# Patient Record
Sex: FEMALE | Race: BLACK OR AFRICAN AMERICAN | Employment: FULL TIME | URBAN - METROPOLITAN AREA
[De-identification: names, ages, dates, MRNs, and addresses within clinical notes are randomized per-mention and may not be internally consistent; named-entity substitution may affect disease eponyms.]

---

## 2017-01-19 ENCOUNTER — OFFICE VISIT (OUTPATIENT)
Dept: FAMILY MEDICINE CLINIC | Age: 32
End: 2017-01-19

## 2017-01-19 VITALS
WEIGHT: 293 LBS | BODY MASS INDEX: 51.91 KG/M2 | HEART RATE: 91 BPM | RESPIRATION RATE: 20 BRPM | HEIGHT: 63 IN | TEMPERATURE: 99.2 F | DIASTOLIC BLOOD PRESSURE: 88 MMHG | OXYGEN SATURATION: 98 % | SYSTOLIC BLOOD PRESSURE: 124 MMHG

## 2017-01-19 DIAGNOSIS — Z11.4 SCREENING FOR HIV (HUMAN IMMUNODEFICIENCY VIRUS): ICD-10-CM

## 2017-01-19 DIAGNOSIS — E04.9 ENLARGED THYROID: Primary | ICD-10-CM

## 2017-01-19 DIAGNOSIS — R13.10 DYSPHAGIA, UNSPECIFIED TYPE: ICD-10-CM

## 2017-01-19 NOTE — PROGRESS NOTES
HISTORY OF PRESENT ILLNESS  Leticia Madrid is a 33 y.o. female. HPI  Patient comes in today for difficulty swallowing and dyspnea. When laying down, feels like throat is constricting and has hard time breathing. Feels like lumps going down throat when she drinks for 2-3 weeks. Doesn't happen daily. If she sits up or lays flat, goes away. Normally happens when laying on side. Feels like throat extremely dry. No throat pain. Feels like choking. Daughter had strep over summer but nothing recently. No fever, chills, cough, rhinorrhea, nasal congestion. Snores at night. Unsure if has apnea. Denies weight changes, chest pains, palpitations, hand tremors, heat or cold intolerance, constipation or diarrhea, dry skin, hair loss or brittle nails, fatigue. FH of thyroid disease (MGF - thyroid Ca)  No Known Allergies    Past Medical History   Diagnosis Date    Anemia     Mixed incontinence urge and stress        Past Surgical History   Procedure Laterality Date    Hx tubal ligation       2005       Social History     Social History    Marital status: SINGLE     Spouse name: N/A    Number of children: N/A    Years of education: N/A     Occupational History    Not on file. 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          Family History   Problem Relation Age of Onset    No Known Problems Mother     Hypertension Father     Diabetes Sister     No Known Problems Brother     Diabetes Sister     Cancer Maternal Grandfather      thyroid    Stroke Maternal Grandmother        No current outpatient prescriptions on file. No current facility-administered medications for this visit. Review of Systems   Constitutional: Negative for chills, diaphoresis, fever, malaise/fatigue and weight loss. HENT: Negative for congestion, ear pain, sore throat and tinnitus. Difficulty swallowing   Respiratory: Negative for cough, sputum production, shortness of breath and wheezing. Cardiovascular: Negative for chest pain, palpitations and leg swelling. Gastrointestinal: Negative for abdominal pain, blood in stool, constipation, diarrhea, heartburn, nausea and vomiting. Genitourinary: Negative for dysuria, flank pain, frequency, hematuria and urgency. Musculoskeletal: Negative for back pain. Skin: Negative. Neurological: Negative for dizziness and headaches. Endo/Heme/Allergies: Negative for environmental allergies. Vitals:    01/19/17 1545   BP: 124/88   Pulse: 91   Resp: 20   Temp: 99.2 °F (37.3 °C)   TempSrc: Oral   SpO2: 98%   Weight: 309 lb (140.2 kg)   Height: 5' 3\" (1.6 m)     Physical Exam   Constitutional: She is oriented to person, place, and time. Vital signs are normal. She appears well-developed and well-nourished. She is cooperative. HENT:   Right Ear: Hearing, tympanic membrane, external ear and ear canal normal.   Left Ear: Hearing, tympanic membrane, external ear and ear canal normal.   Nose: Nose normal.   Mouth/Throat: Uvula is midline, oropharynx is clear and moist and mucous membranes are normal.   Neck: Trachea normal, normal range of motion and full passive range of motion without pain. Neck supple. Thyromegaly present. No thyroid mass present. Cardiovascular: Normal rate, regular rhythm and normal heart sounds. Pulmonary/Chest: Effort normal and breath sounds normal.   Lymphadenopathy:        Head (right side): No submental, no submandibular and no tonsillar adenopathy present. Head (left side): No submental, no submandibular and no tonsillar adenopathy present. She has no cervical adenopathy. Neurological: She is alert and oriented to person, place, and time. Skin: Skin is warm and dry. Psychiatric: She has a normal mood and affect. Her behavior is normal. Judgment and thought content normal.     ASSESSMENT and PLAN    ICD-10-CM ICD-9-CM    1. Enlarged thyroid E04.9 240.9 US THYROID/PARATHYROID/SOFT TISS      THYROID PANEL W/TSH   2. Dysphagia, unspecified type R13.10 787.20 US THYROID/PARATHYROID/SOFT TISS      THYROID PANEL W/TSH     Encounter Diagnoses   Name Primary?  Enlarged thyroid Yes    Dysphagia, unspecified type      Orders Placed This Encounter    US THYROID/PARATHYROID/SOFT TISS    THYROID PANEL W/TSH     Leonela Degroot was seen today for breathing problem. Diagnoses and all orders for this visit:    Enlarged thyroid  -     US THYROID/PARATHYROID/SOFT TISS; Future - scheduled at [facility] at 430p (arrive by 4p)  -     THYROID PANEL W/TSH    Dysphagia, unspecified type  -     US THYROID/PARATHYROID/SOFT TISS; Future  -     THYROID PANEL W/TSH      Follow-up Disposition: Not on File  lab results and schedule of future lab studies reviewed with patient  radiology results and schedule of future radiology studies reviewed with patient    I have reviewed the patient's allergies and made any necessary changes. Medical, procedural, social and family histories have been reviewed and updated as medically indicated. I have reconciled and/or revised patient medications in the EMR. I have discussed each diagnosis listed in this note with Jarrett Frost and/or their family. I have discussed treatment options and the risk/benefit analysis of those options, including safe use of medications and possible medication side effects. Through the use of shared decision making we have agreed to the above plan.       The patient has received an after-visit summary and questions were answered concerning future plans. Maritza Matute, FNP-C    This note will not be viewable in [facility].

## 2017-01-19 NOTE — PATIENT INSTRUCTIONS
Goiter: Care Instructions  Your Care Instructions    A goiter is an enlarged thyroid gland. It can cause swelling in your neck. Your thyroid is found in the front of your neck. It makes a hormone that controls how your body uses energy. Goiters are caused by different things. Some are caused by high or low levels of thyroid hormone. Others are caused by too little iodine in the diet, or a growth or disease in the thyroid. In the [place], most goiters are caused by long-term autoimmune thyroiditis. This is also called Hashimoto's thyroiditis. It happens when the body's immune system damages the thyroid. You may take thyroid hormone to reduce the size of your goiter. Or you may need surgery or radioactive iodine treatment. Some people don't need any treatment. They only need to watch for changes in the goiter. Follow-up care is a key part of your treatment and safety. Be sure to make and go to all appointments, and call your doctor if you are having problems. It's also a good idea to know your test results and keep a list of the medicines you take. How can you care for yourself at home? · Be safe with medicines. Take your medicines exactly as prescribed. Call your doctor if you think you are having a problem with your medicine. You will get more details on the specific medicines your doctor prescribes. When should you call for help? Call 911 anytime you think you may need emergency care. For example, call if:  · You have trouble breathing. Watch closely for changes in your health, and be sure to contact your doctor if:  · Your eyes turn red and bulge. · You have trouble swallowing. · You feel very tired or weak. · You lose weight but are eating the same or more than usual.  Where can you learn more? Go to http://ezra-ezequiel.info/. Enter U151 in the search box to learn more about \"Goiter: Care Instructions. \"  Current as of: July 28, 2016  Content Version: 11.1  © 7898-0438 [employer]. Care instructions adapted under license by [employer] (which disclaims liability or warranty for this information). If you have questions about a medical condition or this instruction, always ask your healthcare professional. [address] any warranty or liability for your use of this information.

## 2017-01-19 NOTE — MR AVS SNAPSHOT
Visit Information Date & Time Provider Department Dept. Phone Encounter #  
 1/19/2017  3:30 PM Jean-Pierre Mistry [phone] [id number] Follow-up Instructions Return if symptoms worsen or fail to improve. Upcoming Health Maintenance Date Due  
 PAP AKA CERVICAL CYTOLOGY 4/6/2019 DTaP/Tdap/Td series (2 - Td) 3/18/2026 Allergies as of 1/19/2017  Review Complete On: 1/19/2017 By: Dunia Marinelli LPN No Known Allergies Current Immunizations  Reviewed on 3/21/2016 Name Date Tdap 3/18/2016  9:16 AM  
  
 Not reviewed this visit You Were Diagnosed With   
  
 Codes Comments Enlarged thyroid    -  Primary ICD-10-CM: E04.9 ICD-9-CM: 240.9 Dysphagia, unspecified type     ICD-10-CM: R13.10 ICD-9-CM: 787.20 Screening for HIV (human immunodeficiency virus)     ICD-10-CM: Z11.4 ICD-9-CM: V73.89 Vitals BP Pulse Temp Resp Height(growth percentile) Weight(growth percentile) 124/88 91 99.2 °F (37.3 °C) (Oral) 20 5' 3\" (1.6 m) 309 lb (140.2 kg) LMP SpO2 BMI OB Status Smoking Status 01/18/2017 98% 54.74 kg/m2 Having regular periods Former Smoker Vitals History BMI and BSA Data Body Mass Index Body Surface Area 54.74 kg/m 2 2.5 m 2 Preferred Pharmacy Pharmacy Name Phone [facility] [address] [place] [address] [place]  [place] [phone] Your Updated Medication List  
  
Notice  As of 1/19/2017  4:28 PM  
 You have not been prescribed any medications. We Performed the Following HIV 1/2 AG/AB, 4TH GENERATION,W RFLX CONFIRM [[id number] Custom] THYROID PANEL W/TSH [16679 CPT(R)] Follow-up Instructions Return if symptoms worsen or fail to improve. To-Do List   
 01/19/2017   Imaging:  US THYROID/PARATHYROID/SOFT TISS   
  
 01/20/2017 4:30 PM  
 Appointment with Claudio Anaya at [facility] Ultrasound ([phone]) GENERAL INSTRUCTIONS  1. Bring outside films ([employer]) pertaining to the affected area with you on the day of appointment. 2. A written order with a valid diagnosis and Physicians signature is required for all scheduled tests. 3. Check in at registration 30 minutes before your appointment time unless you were instructed to do otherwise. Patient Instructions Goiter: Care Instructions Your Care Instructions A goiter is an enlarged thyroid gland. It can cause swelling in your neck. Your thyroid is found in the front of your neck. It makes a hormone that controls how your body uses energy. Goiters are caused by different things. Some are caused by high or low levels of thyroid hormone. Others are caused by too little iodine in the diet, or a growth or disease in the thyroid. In the [place], most goiters are caused by long-term autoimmune thyroiditis. This is also called Hashimoto's thyroiditis. It happens when the body's immune system damages the thyroid. You may take thyroid hormone to reduce the size of your goiter. Or you may need surgery or radioactive iodine treatment. Some people don't need any treatment. They only need to watch for changes in the goiter. Follow-up care is a key part of your treatment and safety. Be sure to make and go to all appointments, and call your doctor if you are having problems. It's also a good idea to know your test results and keep a list of the medicines you take. How can you care for yourself at home? · Be safe with medicines. Take your medicines exactly as prescribed. Call your doctor if you think you are having a problem with your medicine. You will get more details on the specific medicines your doctor prescribes. When should you call for help? Call 911 anytime you think you may need emergency care. For example, call if: · You have trouble breathing. Watch closely for changes in your health, and be sure to contact your doctor if: 
· Your eyes turn red and bulge. · You have trouble swallowing. · You feel very tired or weak. · You lose weight but are eating the same or more than usual. 
Where can you learn more? Go to http://ezra-ezequiel.info/. Enter X770 in the search box to learn more about \"Goiter: Care Instructions. \" Current as of: July 28, 2016 Content Version: 11.1 © 3417-7911 [employer]. Care instructions adapted under license by [employer] (which disclaims liability or warranty for this information). If you have questions about a medical condition or this instruction, always ask your healthcare professional. [address] any warranty or liability for your use of this information. Introducing [address]! Noman Mayes introduces [employer] patient portal. Now you can access parts of your medical record, email your doctor's office, and request medication refills online. 1. In your internet browser, go to [email or web address] 2. Click on the First Time User? Click Here link in the Sign In box. You will see the New Member Sign Up page. 3. Enter your [employer] Access Code exactly as it appears below. You will not need to use this code after youve completed the sign-up process. If you do not sign up before the expiration date, you must request a new code. · [employer] Access Code: [email or web address] Expires: 4/19/2017  4:11 PM 
 
4. Enter the last four digits of your Social Security Number (xxxx) and Date of Birth (mm/dd/yyyy) as indicated and click Submit. You will be taken to the next sign-up page. 5. Create a Pl[employer] ID. This will be your [employer] login ID and cannot be changed, so think of one that is secure and easy to remember. 6. Create a [employer] password. You can change your password at any time. 7. Enter your Password Reset Question and Answer. This can be used at a later time if you forget your password. 8. Enter your e-mail address. You will receive e-mail notification when new information is available in [address] 9. Click Sign Up. You can now view and download portions of your medical record. 10. Click the Download Summary menu link to download a portable copy of your medical information. If you have questions, please visit the Frequently Asked Questions section of the [employer] website. Remember, [employer] is NOT to be used for urgent needs. For medical emergencies, dial 911. Now available from your iPhone and Android! Please provide this summary of care documentation to your next provider. Your primary care clinician is listed as [address] [facility]. If you have any questions after today's visit, please call [phone].

## 2017-01-20 ENCOUNTER — HOSPITAL ENCOUNTER (OUTPATIENT)
Dept: ULTRASOUND IMAGING | Age: 32
Discharge: HOME | End: 2017-01-20
Payer: COMMERCIAL

## 2017-01-20 DIAGNOSIS — R13.10 DYSPHAGIA, UNSPECIFIED TYPE: ICD-10-CM

## 2017-01-20 DIAGNOSIS — E04.9 ENLARGED THYROID: ICD-10-CM

## 2017-01-20 LAB
FT4I SERPL CALC-MCNC: 2.1 (ref 1.2–4.9)
HIV 1+2 AB+HIV1 P24 AG SERPL QL IA: NON REACTIVE
T3RU NFR SERPL: 30 % (ref 24–39)
T4 SERPL-MCNC: 6.9 UG/DL (ref 4.5–12)
TSH SERPL DL<=0.005 MIU/L-ACNC: 1.54 UIU/ML (ref 0.45–4.5)

## 2017-01-20 PROCEDURE — 76536 US EXAM OF HEAD AND NECK: CPT

## 2018-04-11 ENCOUNTER — OFFICE VISIT (OUTPATIENT)
Dept: FAMILY MEDICINE CLINIC | Age: 33
End: 2018-04-11

## 2018-04-11 VITALS
HEART RATE: 81 BPM | HEIGHT: 63 IN | DIASTOLIC BLOOD PRESSURE: 95 MMHG | TEMPERATURE: 98 F | WEIGHT: 293 LBS | OXYGEN SATURATION: 100 % | BODY MASS INDEX: 51.91 KG/M2 | RESPIRATION RATE: 18 BRPM | SYSTOLIC BLOOD PRESSURE: 157 MMHG

## 2018-04-11 DIAGNOSIS — Z11.3 SCREEN FOR STD (SEXUALLY TRANSMITTED DISEASE): ICD-10-CM

## 2018-04-11 DIAGNOSIS — G44.209 ACUTE NON INTRACTABLE TENSION-TYPE HEADACHE: ICD-10-CM

## 2018-04-11 DIAGNOSIS — G47.00 INSOMNIA, UNSPECIFIED TYPE: ICD-10-CM

## 2018-04-11 DIAGNOSIS — Z12.4 CERVICAL CANCER SCREENING: ICD-10-CM

## 2018-04-11 DIAGNOSIS — M54.2 NECK PAIN: ICD-10-CM

## 2018-04-11 DIAGNOSIS — Z00.00 ROUTINE GENERAL MEDICAL EXAMINATION AT A HEALTH CARE FACILITY: Primary | ICD-10-CM

## 2018-04-11 DIAGNOSIS — Z11.4 SCREENING FOR HIV (HUMAN IMMUNODEFICIENCY VIRUS): ICD-10-CM

## 2018-04-11 DIAGNOSIS — N63.0 BREAST LUMP: ICD-10-CM

## 2018-04-11 DIAGNOSIS — Z01.419 ENCOUNTER FOR GYNECOLOGICAL EXAMINATION: ICD-10-CM

## 2018-04-11 DIAGNOSIS — E66.01 MORBID OBESITY WITH BMI OF 50.0-59.9, ADULT (HCC): ICD-10-CM

## 2018-04-11 DIAGNOSIS — M54.9 UPPER BACK PAIN: ICD-10-CM

## 2018-04-11 RX ORDER — AMITRIPTYLINE HYDROCHLORIDE 10 MG/1
10 TABLET, FILM COATED ORAL
Qty: 30 TAB | Refills: 1 | Status: SHIPPED | OUTPATIENT
Start: 2018-04-11 | End: 2021-08-17

## 2018-04-11 NOTE — PROGRESS NOTES
HISTORY OF PRESENT ILLNESS  Radha Malcolm is a 27 y.o. female. HPI patient comes in today for CPE (pap)  Feels like throat is dry, difficulty swallowing, feels like throat is swelling up. Neck has been stiff for about 1 month. When she lays down, she has a pain in back of neck. She works at a computer all day. States she is having upper back pain. Not sure if related to posture, breasts, or work. States daily headaches. Sometimes she will wake up with a headache. Patient unsure if she snores. Takes a BC powder and headaches go away. States she does not sleep throughout night. Will last for 3-4 days. Normally goes to bed 9p. No recent eye exam.  Does not squint. Single mother so she is always stressed,. Was taking melatonin for sleep, caused drowsy effect. No vaginal complaints. No discharge, no vaginal lesions. Wants STD screening. No Known Allergies    Past Medical History:   Diagnosis Date    Anemia     Mixed incontinence urge and stress        Past Surgical History:   Procedure Laterality Date    HX TUBAL LIGATION      2005       Social History     Social History    Marital status: SINGLE     Spouse name: N/A    Number of children: N/A    Years of education: N/A     Occupational History    Not on file. 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          Family History   Problem Relation Age of Onset    No Known Problems Mother     Hypertension Father     Diabetes Sister     No Known Problems Brother     Diabetes Sister     Cancer Maternal Grandfather      thyroid    Stroke Maternal Grandmother        No current outpatient prescriptions on file. No current facility-administered medications for this visit. Review of Systems   Constitutional: Negative for chills, diaphoresis, fever, malaise/fatigue and weight loss. HENT: Negative for congestion, ear pain, sore throat and tinnitus. Eyes: Negative for blurred vision and double vision. Respiratory: Negative for cough, sputum production, shortness of breath and wheezing. Cardiovascular: Negative for chest pain, palpitations and leg swelling. Gastrointestinal: Negative for abdominal pain, blood in stool, constipation, diarrhea, nausea and vomiting. Genitourinary: Negative for dysuria, flank pain, frequency, hematuria and urgency. Musculoskeletal: Positive for back pain and neck pain. Negative for joint pain and myalgias. Skin: Negative. Neurological: Positive for headaches. Negative for dizziness, tingling, sensory change, speech change and focal weakness. Psychiatric/Behavioral: Negative for depression. The patient has insomnia. The patient is not nervous/anxious. Vitals:    04/11/18 1550 04/11/18 1627   BP: (!) 157/95 (!) (P) 138/92   Pulse: 81    Resp: 18    Temp: 98 °F (36.7 °C)    TempSrc: Oral    SpO2: 100%    Weight: 303 lb 12.8 oz (137.8 kg)    Height: 5' 3\" (1.6 m)      Physical Exam   Constitutional: She is oriented to person, place, and time. Vital signs are normal. She appears well-developed and well-nourished. She is cooperative. HENT:   Right Ear: Hearing, tympanic membrane, external ear and ear canal normal.   Left Ear: Hearing, tympanic membrane, external ear and ear canal normal.   Nose: Nose normal. Right sinus exhibits no maxillary sinus tenderness and no frontal sinus tenderness. Left sinus exhibits no maxillary sinus tenderness and no frontal sinus tenderness.    Mouth/Throat: Uvula is midline, oropharynx is clear and moist and mucous membranes are normal. Mucous membranes are not pale and not dry. No oropharyngeal exudate, posterior oropharyngeal edema or posterior oropharyngeal erythema. Neck: No thyroid mass and no thyromegaly present. Cardiovascular: Normal rate, regular rhythm, S1 normal, S2 normal and normal heart sounds. No murmur heard. Pulses:       Radial pulses are 2+ on the right side, and 2+ on the left side. Dorsalis pedis pulses are 2+ on the right side, and 2+ on the left side. Posterior tibial pulses are 2+ on the right side, and 2+ on the left side. Pulmonary/Chest: Effort normal and breath sounds normal. She has no decreased breath sounds. She has no wheezes. She has no rhonchi. She has no rales. Right breast exhibits mass (small beadline masses noted ). Right breast exhibits no inverted nipple, no nipple discharge, no skin change and no tenderness. Left breast exhibits mass (small beadlike masses noted). Left breast exhibits no inverted nipple, no nipple discharge, no skin change and no tenderness. Breasts are symmetrical.   Abdominal: Soft. Normal appearance and bowel sounds are normal. There is no hepatosplenomegaly. There is no tenderness. There is no CVA tenderness. abd obese, exam limited by body habitus   Genitourinary: Uterus normal. There is no rash, tenderness or lesion on the right labia. There is no rash, tenderness or lesion on the left labia. Uterus is not enlarged and not tender. Cervix exhibits no motion tenderness. Right adnexum displays no mass, no tenderness and no fullness. Left adnexum displays no mass, no tenderness and no fullness. No erythema or tenderness in the vagina. No vaginal discharge found. Genitourinary Comments: Bimanual exam limited by body habitus   Lymphadenopathy:        Head (right side): No submental, no submandibular, no tonsillar, no preauricular and no posterior auricular adenopathy present.         Head (left side): No submental, no submandibular, no tonsillar, no preauricular and no posterior auricular adenopathy present. She has no cervical adenopathy. Right: No supraclavicular adenopathy present. Left: No supraclavicular adenopathy present. Neurological: She is alert and oriented to person, place, and time. Skin: Skin is warm, dry and intact. Psychiatric: She has a normal mood and affect. Her speech is normal and behavior is normal. Thought content normal.   Vitals reviewed. ASSESSMENT and PLAN    ICD-10-CM ICD-9-CM    1. Routine general medical examination at a health care facility Z00.00 V70.0 AMB POC URINALYSIS DIP STICK AUTO W/O MICRO      CBC WITH AUTOMATED DIFF      METABOLIC PANEL, COMPREHENSIVE      AMB POC HEMOGLOBIN A1C      LIPID PANEL      VITAMIN D, 25 HYDROXY   2. Acute non intractable tension-type headache G44.209 339.10    3. Neck pain M54.2 723.1    4. Upper back pain M54.9 724.5    5. Insomnia, unspecified type G47.00 780.52    6. Cervical cancer screening Z12.4 V76.2 PAP (IMAGE GUIDED) W/REFL HPV ALL PATHOLOGY ([id number])   7. Breast lump N63.0 611.72 RIAZ MAMMO BI DX INCL CAD      US BREASTS COMPLETE   8. Screening for HIV (human immunodeficiency virus) Z11.4 V73.89 HIV 1/2 AG/AB, 4TH GENERATION,W RFLX CONFIRM   9. Encounter for gynecological examination Z01.419 V72.31    10. Screen for STD (sexually transmitted disease) Z11.3 V74.5 NUSWAB VAGINITIS PLUS     Encounter Diagnoses   Name Primary?     Routine general medical examination at a health care facility Yes    Acute non intractable tension-type headache     Neck pain     Upper back pain     Insomnia, unspecified type     Cervical cancer screening     Breast lump     Screening for HIV (human immunodeficiency virus)     Encounter for gynecological examination     Screen for STD (sexually transmitted disease)     Morbid obesity with BMI of 50.0-59.9, adult ([address]      Orders Placed This Encounter    RIAZ MAMMO BI DX INCL CAD    US BREASTS COMPLETE    CBC WITH AUTOMATED DIFF    METABOLIC PANEL, COMPREHENSIVE    LIPID PANEL    VITAMIN D, 25 HYDROXY    NUSWAB VAGINITIS PLUS    HIV 1/2 AG/AB, 4TH GENERATION,W RFLX CONFIRM    AMB POC URINALYSIS DIP STICK AUTO W/O MICRO    AMB POC HEMOGLOBIN A1C    amitriptyline (ELAVIL) 10 mg tablet    PAP (IMAGE GUIDED) W/REFL HPV ALL PATHOLOGY ([id number])     Diagnoses and all orders for this visit:    1. Routine general medical examination at a health care facility  -     AMB POC URINALYSIS DIP STICK AUTO W/O MICRO  -     CBC WITH AUTOMATED DIFF  -     METABOLIC PANEL, COMPREHENSIVE  -     AMB POC HEMOGLOBIN A1C  -     LIPID PANEL  -     VITAMIN D, 25 HYDROXY    2. Acute non intractable tension-type headache - patient to make appointment for eye exam.  Work on stress reduction. Patient to discuss with kids if she snores, may need CARINA evaluation. -     amitriptyline (ELAVIL) 10 mg tablet; Take 1 Tab by mouth nightly as needed for Sleep. 3. Neck pain - OTC NSAIDs, heat, HEP    4. Upper back pain- OTC NSAIDs, heat, HEP. Letter written for lumbar support in chair    5. Insomnia, unspecified type  -     amitriptyline (ELAVIL) 10 mg tablet; Take 1 Tab by mouth nightly as needed for Sleep. 6. Cervical cancer screening  -     PAP (IMAGE GUIDED) W/REFL HPV ALL PATHOLOGY ([id number])    7. Breast lump  -     RIAZ MAMMO BI DX INCL CAD; Future  -     US BREASTS COMPLETE; Future    8. Screening for HIV (human immunodeficiency virus)  -     HIV 1/2 AG/AB, 4TH GENERATION,W RFLX CONFIRM    9. Encounter for gynecological examination    10. Screen for STD (sexually transmitted disease)  -     NUSWAB VAGINITIS PLUS    11. Morbid obesity with BMI of 50.0-59.9, adult ([facility] [address]  -     I have reviewed/discussed the above normal BMI with the patient. I have recommended the following interventions: dietary management education, guidance, and counseling and encourage exercise .     -     Patient to schedule appointment for EKG as she is interested in phentermine for weight loss. Follow-up Disposition:  Return in about 1 year (around 4/11/2019). lab results and schedule of future lab studies reviewed with patient  reviewed diet, exercise and weight control    I have reviewed the patient's allergies and made any necessary changes. Medical, procedural, social and family histories have been reviewed and updated as medically indicated. I have reconciled and/or revised patient medications in the EMR. I have discussed each diagnosis listed in this note with Coleen Erickson and/or their family. I have discussed treatment options and the risk/benefit analysis of those options, including safe use of medications and possible medication side effects. Through the use of shared decision making we have agreed to the above plan. The patient has received an after-visit summary and questions were answered concerning future plans. Maritza Matute, [id number]    This note will not be viewable in [employer].

## 2018-04-11 NOTE — MR AVS SNAPSHOT
[address] [facility], [place] 
 
 
 [facility] [id number] [phone] Patient: Joann Sneed MRN: [id number] FPA:[id number] Visit Information Date & Time Provider Department Dept. Phone Encounter #  
 4/11/2018  3:30 PM Jean-Pierre Andersen [phone] [id number] Follow-up Instructions Return in about 1 year (around 4/11/2019). Upcoming Health Maintenance Date Due Influenza Age 5 to Adult 8/1/2017 PAP AKA CERVICAL CYTOLOGY 4/6/2019 DTaP/Tdap/Td series (2 - Td) 3/18/2026 Allergies as of 4/11/2018  Review Complete On: 4/11/2018 By: Carlo Valle LPN No Known Allergies Current Immunizations  Reviewed on 3/21/2016 Name Date Tdap 3/18/2016  9:16 AM  
  
 Not reviewed this visit You Were Diagnosed With   
  
 Codes Comments Routine general medical examination at a health care facility    -  Primary ICD-10-CM: Z00.00 ICD-9-CM: V70.0 Acute non intractable tension-type headache     ICD-10-CM: Z94.934 ICD-9-CM: 339.10 Neck pain     ICD-10-CM: M54.2 ICD-9-CM: 723.1 Upper back pain     ICD-10-CM: M54.9 ICD-9-CM: 724.5 Insomnia, unspecified type     ICD-10-CM: G47.00 ICD-9-CM: 780.52 Cervical cancer screening     ICD-10-CM: Z12.4 ICD-9-CM: V76.2 Breast lump     ICD-10-CM: N63.0 ICD-9-CM: 611.72 Screening for HIV (human immunodeficiency virus)     ICD-10-CM: Z11.4 ICD-9-CM: V73.89 Encounter for gynecological examination     ICD-10-CM: H41.344 ICD-9-CM: V72.31 Screen for STD (sexually transmitted disease)     ICD-10-CM: Z11.3 ICD-9-CM: V74.5 Vitals BP Pulse Temp Resp Height(growth percentile) Weight(growth percentile)  
 (!) (P) 138/92 81 98 °F (36.7 °C) (Oral) 18 5' 3\" (1.6 m) 303 lb 12.8 oz (137.8 kg) LMP SpO2 BMI OB Status Smoking Status 04/01/2018 100% 53.82 kg/m2 Having regular periods Former Smoker Vitals History BMI and BSA Data Body Mass Index Body Surface Area  
 53.82 kg/m 2 2.47 m 2 Preferred Pharmacy Pharmacy Name Phone [facility]  [place] [phone] Your Updated Medication List  
  
   
This list is accurate as of 4/11/18  5:00 PM.  Always use your most recent med list.  
  
  
  
  
 amitriptyline 10 mg tablet Commonly known as:  ELAVIL Take 1 Tab by mouth nightly as needed for Sleep. Prescriptions Sent to Pharmacy Refills  
 amitriptyline (ELAVIL) 10 mg tablet 1 Sig: Take 1 Tab by mouth nightly as needed for Sleep. Class: Normal  
 [facility] [address] #: [phone] Route: Oral  
  
We Performed the Following AMB POC HEMOGLOBIN A1C [29697 CPT(R)] AMB POC URINALYSIS DIP STICK AUTO W/O MICRO [36906 CPT(R)] CBC WITH AUTOMATED DIFF [92154 CPT(R)] HIV 1/2 AG/AB, 4TH GENERATION,W RFLX CONFIRM [id number] CPT(R)] LIPID PANEL [12668 CPT(R)] METABOLIC PANEL, COMPREHENSIVE [74364 CPT(R)] [address] [id number] Custom] PAP (IMAGE GUIDED) W/REFL HPV ALL PATHOLOGY (165128) [[id number] Custom] VITAMIN D, 25 HYDROXY [id number] CPT(R)] Follow-up Instructions Return in about 1 year (around 4/11/2019). To-Do List   
 04/11/2018 Imaging:  RIAZ MAMMO BI DX INCL CAD   
  
 04/11/2018 Imaging:  US BREASTS COMPLETE Patient Instructions Back Stretches: Exercises Your Care Instructions Here are some examples of exercises for stretching your back. Start each exercise slowly. Ease off the exercise if you start to have pain. Your doctor or physical therapist will tell you when you can start these exercises and which ones will work best for you. How to do the exercises Overhead stretch 1. Stand comfortably with your feet shoulder-width apart. 2. Looking straight ahead, raise both arms over your head and reach toward the ceiling. Do not allow your head to tilt back. 3. Hold for 15 to 30 seconds, then lower your arms to your sides. 4. Repeat 2 to 4 times. Side stretch 1. Stand comfortably with your feet shoulder-width apart. 2. Raise one arm over your head, and then lean to the other side. 3. Slide your hand down your leg as you let the weight of your arm gently stretch your side muscles. Hold for 15 to 30 seconds. 4. Repeat 2 to 4 times on each side. Press-up 1. Lie on your stomach, supporting your body with your forearms. 2. Press your elbows down into the floor to raise your upper back. As you do this, relax your stomach muscles and allow your back to arch without using your back muscles. As your press up, do not let your hips or pelvis come off the floor. 3. Hold for 15 to 30 seconds, then relax. 4. Repeat 2 to 4 times. Relax and rest 
 
1. Lie on your back with a rolled towel under your neck and a pillow under your knees. Extend your arms comfortably to your sides. 2. Relax and breathe normally. 3. Remain in this position for about 10 minutes. 4. If you can, do this 2 or 3 times each day. Follow-up care is a key part of your treatment and safety. Be sure to make and go to all appointments, and call your doctor if you are having problems. It's also a good idea to know your test results and keep a list of the medicines you take. Where can you learn more? Go to http://ezra-ezequiel.info/. Enter Z489 in the search box to learn more about \"Back Stretches: Exercises. \" Current as of: March 21, 2017 Content Version: 11.4 © 8271-1123 Healthwise, Incorporated. Care instructions adapted under license by [employer] (which disclaims liability or warranty for this information).  If you have questions about a medical condition or this instruction, always ask your healthcare professional. [place] [address] any warranty or liability for your use of this information. Neck: Exercises Your Care Instructions Here are some examples of typical rehabilitation exercises for your condition. Start each exercise slowly. Ease off the exercise if you start to have pain. Your doctor or physical therapist will tell you when you can start these exercises and which ones will work best for you. How to do the exercises Neck stretch 5. This stretch works best if you keep your shoulder down as you lean away from it. To help you remember to do this, start by relaxing your shoulders and lightly holding on to your thighs or your chair. 6. Tilt your head toward your shoulder and hold for 15 to 30 seconds. Let the weight of your head stretch your muscles. 7. If you would like a little added stretch, use your hand to gently and steadily pull your head toward your shoulder. For example, keeping your right shoulder down, lean your head to the left. 8. Repeat 2 to 4 times toward each shoulder. Diagonal neck stretch 5. Turn your head slightly toward the direction you will be stretching, and tilt your head diagonally toward your chest and hold for 15 to 30 seconds. 6. If you would like a little added stretch, use your hand to gently and steadily pull your head forward on the diagonal. 
7. Repeat 2 to 4 times toward each side. Dorsal glide stretch The dorsal glide stretches the back of the neck. If you feel pain, do not glide so far back. Some people find this exercise easier to do while lying on their backs with an ice pack on the neck. 5. Sit or stand tall and look straight ahead. 6. Slowly tuck your chin as you glide your head backward over your body 7. Hold for a count of 6, and then relax for up to 10 seconds. 8. Repeat 8 to 12 times. Chest and shoulder stretch 5. Sit or stand tall and glide your head backward as in the dorsal glide stretch. 6. Raise both arms so that your hands are next to your ears. 7. Take a deep breath, and as you breathe out, lower your elbows down and behind your back. You will feel your shoulder blades slide down and together, and at the same time you will feel a stretch across your chest and the front of your shoulders. 8. Hold for about 6 seconds, and then relax for up to 10 seconds. 9. Repeat 8 to 12 times. Strengthening: Hands on head 1. Move your head backward, forward, and side to side against gentle pressure from your hands, holding each position for about 6 seconds. 2. Repeat 8 to 12 times. Follow-up care is a key part of your treatment and safety. Be sure to make and go to all appointments, and call your doctor if you are having problems. It's also a good idea to know your test results and keep a list of the medicines you take. Where can you learn more? Go to http://ezra-ezequiel.info/. Enter P975 in the search box to learn more about \"Neck: Exercises. \" Current as of: March 21, 2017 Content Version: 11.4 © 5761-9104 [employer]. Care instructions adapted under license by [facility] (which disclaims liability or warranty for this information). If you have questions about a medical condition or this instruction, always ask your healthcare professional. [place] [address] any warranty or liability for your use of this information. Headache: Care Instructions Your Care Instructions Headaches have many possible causes. Most headaches aren't a sign of a more serious problem, and they will get better on their own. Home treatment may help you feel better faster. The doctor has checked you carefully, but problems can develop later. If you notice any problems or new symptoms, get medical treatment right away. Follow-up care is a key part of your treatment and safety. Be sure to make and go to all appointments, and call your doctor if you are having problems. It's also a good idea to know your test results and keep a list of the medicines you take. How can you care for yourself at home? · Do not drive if you have taken a prescription pain medicine. · Rest in a quiet, dark room until your headache is gone. Close your eyes and try to relax or go to sleep. Don't watch TV or read. · Put a cold, moist cloth or cold pack on the painful area for 10 to 20 minutes at a time. Put a thin cloth between the cold pack and your skin. · Use a warm, moist towel or a heating pad set on low to relax tight shoulder and neck muscles. · Have someone gently massage your neck and shoulders. · Take pain medicines exactly as directed. ¨ If the doctor gave you a prescription medicine for pain, take it as prescribed. ¨ If you are not taking a prescription pain medicine, ask your doctor if you can take an over-the-counter medicine. · Be careful not to take pain medicine more often than the instructions allow, because you may get worse or more frequent headaches when the medicine wears off. · Do not ignore new symptoms that occur with a headache, such as a fever, weakness or numbness, vision changes, or confusion. These may be signs of a more serious problem. To prevent headaches · Keep a headache diary so you can figure out what triggers your headaches. Avoiding triggers may help you prevent headaches. Record when each headache began, how long it lasted, and what the pain was like (throbbing, aching, stabbing, or dull). Write down any other symptoms you had with the headache, such as nausea, flashing lights or dark spots, or sensitivity to bright light or loud noise. Note if the headache occurred near your period.  List anything that might have triggered the headache, such as certain foods (chocolate, cheese, wine) or odors, smoke, bright light, stress, or lack of sleep. · Find healthy ways to deal with stress. Headaches are most common during or right after stressful times. Take time to relax before and after you do something that has caused a headache in the past. 
· Try to keep your muscles relaxed by keeping good posture. Check your jaw, face, neck, and shoulder muscles for tension, and try relaxing them. When sitting at a desk, change positions often, and stretch for 30 seconds each hour. · Get plenty of sleep and exercise. · Eat regularly and well. Long periods without food can trigger a headache. · Treat yourself to a massage. Some people find that regular massages are very helpful in relieving tension. · Limit caffeine by not drinking too much coffee, tea, or soda. But don't quit caffeine suddenly, because that can also give you headaches. · Reduce eyestrain from computers by blinking frequently and looking away from the computer screen every so often. Make sure you have proper eyewear and that your monitor is set up properly, about an arm's length away. · Seek help if you have depression or anxiety. Your headaches may be linked to these conditions. Treatment can both prevent headaches and help with symptoms of anxiety or depression. When should you call for help? Call 911 anytime you think you may need emergency care. For example, call if: 
? · You have signs of a stroke. These may include: 
¨ Sudden numbness, paralysis, or weakness in your face, arm, or leg, especially on only one side of your body. ¨ Sudden vision changes. ¨ Sudden trouble speaking. ¨ Sudden confusion or trouble understanding simple statements. ¨ Sudden problems with walking or balance. ¨ A sudden, severe headache that is different from past headaches. ?Call your doctor now or seek immediate medical care if: 
? · You have a new or worse headache. ? · Your headache gets much worse. Where can you learn more? Go to http://ezra-ezequiel.info/. Enter M271 in the search box to learn more about \"Headache: Care Instructions. \" Current as of: October 14, 2016 Content Version: 11.4 © 0074-7998 Healthwise, Incorporated. Care instructions adapted under license by [employer] (which disclaims liability or warranty for this information). If you have questions about a medical condition or this instruction, always ask your healthcare professional. [place] [address] any warranty or liability for your use of this information. Introducing [facility]! [employer] introduces [employer] patient portal. Now you can access parts of your medical record, email your doctor's office, and request medication refills online. 1. In your internet browser, go to [email or web address] 2. Click on the First Time User? Click Here link in the Sign In box. You will see the New Member Sign Up page. 3. Enter your [employer]Bad Access Code exactly as it appears below. You will not need to use this code after youve completed the sign-up process. If you do not sign up before the expiration date, you must request a new code. · [employer] Access Code: [email or web address] Expires: 7/10/2018  3:40 PM 
 
4. Enter the last four digits of your Social Security Number (xxxx) and Date of Birth (mm/dd/yyyy) as indicated and click Submit. You will be taken to the next sign-up page. 5. Create a BigBad ID. This will be your [employer]Bad login ID and cannot be changed, so think of one that is secure and easy to remember. 6. Create a BigBad password. You can change your password at any time. 7. Enter your Password Reset Question and Answer. This can be used at a later time if you forget your password. 8. Enter your e-mail address. You will receive e-mail notification when new information is available in [address] 9. Click Sign Up. You can now view and download portions of your medical record. 10. Click the Download Summary menu link to download a portable copy of your medical information. If you have questions, please visit the Frequently Asked Questions section of the [employer] website. Remember, [employer] is NOT to be used for urgent needs. For medical emergencies, dial 911. Now available from your iPhone and Android! Please provide this summary of care documentation to your next provider. Your primary care clinician is listed as [facility]. If you have any questions after today's visit, please call [phone].

## 2018-04-11 NOTE — PROGRESS NOTES
Chief Complaint   Patient presents with    Complete Physical     with pap     Pt declined flu vaccination today.

## 2018-04-11 NOTE — PATIENT INSTRUCTIONS
Back Stretches: Exercises  Your Care Instructions  Here are some examples of exercises for stretching your back. Start each exercise slowly. Ease off the exercise if you start to have pain. Your doctor or physical therapist will tell you when you can start these exercises and which ones will work best for you. How to do the exercises  Overhead stretch    1. Stand comfortably with your feet shoulder-width apart. 2. Looking straight ahead, raise both arms over your head and reach toward the ceiling. Do not allow your head to tilt back. 3. Hold for 15 to 30 seconds, then lower your arms to your sides. 4. Repeat 2 to 4 times. Side stretch    1. Stand comfortably with your feet shoulder-width apart. 2. Raise one arm over your head, and then lean to the other side. 3. Slide your hand down your leg as you let the weight of your arm gently stretch your side muscles. Hold for 15 to 30 seconds. 4. Repeat 2 to 4 times on each side. Press-up    1. Lie on your stomach, supporting your body with your forearms. 2. Press your elbows down into the floor to raise your upper back. As you do this, relax your stomach muscles and allow your back to arch without using your back muscles. As your press up, do not let your hips or pelvis come off the floor. 3. Hold for 15 to 30 seconds, then relax. 4. Repeat 2 to 4 times. Relax and rest    1. Lie on your back with a rolled towel under your neck and a pillow under your knees. Extend your arms comfortably to your sides. 2. Relax and breathe normally. 3. Remain in this position for about 10 minutes. 4. If you can, do this 2 or 3 times each day. Follow-up care is a key part of your treatment and safety. Be sure to make and go to all appointments, and call your doctor if you are having problems. It's also a good idea to know your test results and keep a list of the medicines you take. Where can you learn more? Go to http://ezra-ezequiel.info/.   Enter V492 in the search box to learn more about \"Back Stretches: Exercises. \"  Current as of: March 21, 2017  Content Version: 11.4  © 5594-8778 [employer]. Care instructions adapted under license by [employer] (which disclaims liability or warranty for this information). If you have questions about a medical condition or this instruction, always ask your healthcare professional. [address] any warranty or liability for your use of this information. Neck: Exercises  Your Care Instructions  Here are some examples of typical rehabilitation exercises for your condition. Start each exercise slowly. Ease off the exercise if you start to have pain. Your doctor or physical therapist will tell you when you can start these exercises and which ones will work best for you. How to do the exercises  Neck stretch    5. This stretch works best if you keep your shoulder down as you lean away from it. To help you remember to do this, start by relaxing your shoulders and lightly holding on to your thighs or your chair. 6. Tilt your head toward your shoulder and hold for 15 to 30 seconds. Let the weight of your head stretch your muscles. 7. If you would like a little added stretch, use your hand to gently and steadily pull your head toward your shoulder. For example, keeping your right shoulder down, lean your head to the left. 8. Repeat 2 to 4 times toward each shoulder. Diagonal neck stretch    5. Turn your head slightly toward the direction you will be stretching, and tilt your head diagonally toward your chest and hold for 15 to 30 seconds. 6. If you would like a little added stretch, use your hand to gently and steadily pull your head forward on the diagonal.  7. Repeat 2 to 4 times toward each side. Dorsal glide stretch    The dorsal glide stretches the back of the neck. If you feel pain, do not glide so far back.  Some people find this exercise easier to do while lying on their backs with an ice pack on the neck. 5. Sit or stand tall and look straight ahead. 6. Slowly tuck your chin as you glide your head backward over your body  7. Hold for a count of 6, and then relax for up to 10 seconds. 8. Repeat 8 to 12 times. Chest and shoulder stretch    5. Sit or stand tall and glide your head backward as in the dorsal glide stretch. 6. Raise both arms so that your hands are next to your ears. 7. Take a deep breath, and as you breathe out, lower your elbows down and behind your back. You will feel your shoulder blades slide down and together, and at the same time you will feel a stretch across your chest and the front of your shoulders. 8. Hold for about 6 seconds, and then relax for up to 10 seconds. 9. Repeat 8 to 12 times. Strengthening: Hands on head    1. Move your head backward, forward, and side to side against gentle pressure from your hands, holding each position for about 6 seconds. 2. Repeat 8 to 12 times. Follow-up care is a key part of your treatment and safety. Be sure to make and go to all appointments, and call your doctor if you are having problems. It's also a good idea to know your test results and keep a list of the medicines you take. Where can you learn more? Go to http://ezra-ezequiel.info/. Enter P975 in the search box to learn more about \"Neck: Exercises. \"  Current as of: March 21, 2017  Content Version: 11.4  © 3671-8057 [employer]. Care instructions adapted under license by [employer] (which disclaims liability or warranty for this information). If you have questions about a medical condition or this instruction, always ask your healthcare professional. [place] [address] any warranty or liability for your use of this information. Headache: Care Instructions  Your Care Instructions    Headaches have many possible causes.  Most headaches aren't a sign of a more serious problem, and they will get better on their own. Home treatment may help you feel better faster. The doctor has checked you carefully, but problems can develop later. If you notice any problems or new symptoms, get medical treatment right away. Follow-up care is a key part of your treatment and safety. Be sure to make and go to all appointments, and call your doctor if you are having problems. It's also a good idea to know your test results and keep a list of the medicines you take. How can you care for yourself at home? · Do not drive if you have taken a prescription pain medicine. · Rest in a quiet, dark room until your headache is gone. Close your eyes and try to relax or go to sleep. Don't watch TV or read. · Put a cold, moist cloth or cold pack on the painful area for 10 to 20 minutes at a time. Put a thin cloth between the cold pack and your skin. · Use a warm, moist towel or a heating pad set on low to relax tight shoulder and neck muscles. · Have someone gently massage your neck and shoulders. · Take pain medicines exactly as directed. ¨ If the doctor gave you a prescription medicine for pain, take it as prescribed. ¨ If you are not taking a prescription pain medicine, ask your doctor if you can take an over-the-counter medicine. · Be careful not to take pain medicine more often than the instructions allow, because you may get worse or more frequent headaches when the medicine wears off. · Do not ignore new symptoms that occur with a headache, such as a fever, weakness or numbness, vision changes, or confusion. These may be signs of a more serious problem. To prevent headaches  · Keep a headache diary so you can figure out what triggers your headaches. Avoiding triggers may help you prevent headaches. Record when each headache began, how long it lasted, and what the pain was like (throbbing, aching, stabbing, or dull).  Write down any other symptoms you had with the headache, such as nausea, flashing lights or dark spots, or sensitivity to bright light or loud noise. Note if the headache occurred near your period. List anything that might have triggered the headache, such as certain foods (chocolate, cheese, wine) or odors, smoke, bright light, stress, or lack of sleep. · Find healthy ways to deal with stress. Headaches are most common during or right after stressful times. Take time to relax before and after you do something that has caused a headache in the past.  · Try to keep your muscles relaxed by keeping good posture. Check your jaw, face, neck, and shoulder muscles for tension, and try relaxing them. When sitting at a desk, change positions often, and stretch for 30 seconds each hour. · Get plenty of sleep and exercise. · Eat regularly and well. Long periods without food can trigger a headache. · Treat yourself to a massage. Some people find that regular massages are very helpful in relieving tension. · Limit caffeine by not drinking too much coffee, tea, or soda. But don't quit caffeine suddenly, because that can also give you headaches. · Reduce eyestrain from computers by blinking frequently and looking away from the computer screen every so often. Make sure you have proper eyewear and that your monitor is set up properly, about an arm's length away. · Seek help if you have depression or anxiety. Your headaches may be linked to these conditions. Treatment can both prevent headaches and help with symptoms of anxiety or depression. When should you call for help? Call 911 anytime you think you may need emergency care. For example, call if:  ? · You have signs of a stroke. These may include:  ¨ Sudden numbness, paralysis, or weakness in your face, arm, or leg, especially on only one side of your body. ¨ Sudden vision changes. ¨ Sudden trouble speaking. ¨ Sudden confusion or trouble understanding simple statements. ¨ Sudden problems with walking or balance.   ¨ A sudden, severe headache that is different from past headaches. ?Call your doctor now or seek immediate medical care if:  ? · You have a new or worse headache. ? · Your headache gets much worse. Where can you learn more? Go to http://ezra-ezequiel.info/. Enter M271 in the search box to learn more about \"Headache: Care Instructions. \"  Current as of: October 14, 2016  Content Version: 11.4  © 0562-7872 [employer]. Care instructions adapted under license by [employer] (which disclaims liability or warranty for this information). If you have questions about a medical condition or this instruction, always ask your healthcare professional. [address] any warranty or liability for your use of this information.

## 2018-04-12 LAB
25(OH)D3+25(OH)D2 SERPL-MCNC: 14.4 NG/ML (ref 30–100)
A VAGINAE DNA VAG QL NAA+PROBE: NORMAL SCORE
ALBUMIN SERPL-MCNC: 3.8 G/DL (ref 3.5–5.5)
ALBUMIN/GLOB SERPL: 1.4 {RATIO} (ref 1.2–2.2)
ALP SERPL-CCNC: 66 IU/L (ref 39–117)
ALT SERPL-CCNC: 11 IU/L (ref 0–32)
AST SERPL-CCNC: 12 IU/L (ref 0–40)
BASOPHILS # BLD AUTO: 0 X10E3/UL (ref 0–0.2)
BASOPHILS NFR BLD AUTO: 0 %
BILIRUB SERPL-MCNC: <0.2 MG/DL (ref 0–1.2)
BUN SERPL-MCNC: 11 MG/DL (ref 6–20)
BUN/CREAT SERPL: 13 (ref 9–23)
BVAB2 DNA VAG QL NAA+PROBE: NORMAL
C ALBICANS DNA VAG QL NAA+PROBE: NORMAL
C GLABRATA DNA VAG QL NAA+PROBE: NORMAL
C TRACH RRNA SPEC QL NAA+PROBE: NORMAL
CALCIUM SERPL-MCNC: 9.4 MG/DL (ref 8.7–10.2)
CHLORIDE SERPL-SCNC: 101 MMOL/L (ref 96–106)
CHOLEST SERPL-MCNC: 193 MG/DL (ref 100–199)
CO2 SERPL-SCNC: 25 MMOL/L (ref 18–29)
CREAT SERPL-MCNC: 0.87 MG/DL (ref 0.57–1)
CYTOLOGIST CVX/VAG CYTO: NORMAL
CYTOLOGY CVX/VAG DOC THIN PREP: NORMAL
DX ICD CODE: NORMAL
EOSINOPHIL # BLD AUTO: 0.1 X10E3/UL (ref 0–0.4)
EOSINOPHIL NFR BLD AUTO: 2 %
ERYTHROCYTE [DISTWIDTH] IN BLOOD BY AUTOMATED COUNT: 16 % (ref 12.3–15.4)
GFR SERPLBLD CREATININE-BSD FMLA CKD-EPI: 101 ML/MIN/1.73
GFR SERPLBLD CREATININE-BSD FMLA CKD-EPI: 87 ML/MIN/1.73
GLOBULIN SER CALC-MCNC: 2.8 G/DL (ref 1.5–4.5)
GLUCOSE SERPL-MCNC: 97 MG/DL (ref 65–99)
HCT VFR BLD AUTO: 29.7 % (ref 34–46.6)
HDLC SERPL-MCNC: 42 MG/DL
HGB BLD-MCNC: 9.3 G/DL (ref 11.1–15.9)
HIV 1+2 AB+HIV1 P24 AG SERPL QL IA: NON REACTIVE
IMM GRANULOCYTES # BLD: 0 X10E3/UL (ref 0–0.1)
IMM GRANULOCYTES NFR BLD: 0 %
INTERPRETATION, 910389: NORMAL
LABCORP, 190119: NORMAL
LDLC SERPL CALC-MCNC: 119 MG/DL (ref 0–99)
LYMPHOCYTES # BLD AUTO: 2.8 X10E3/UL (ref 0.7–3.1)
LYMPHOCYTES NFR BLD AUTO: 42 %
Lab: NORMAL
MCH RBC QN AUTO: 24.5 PG (ref 26.6–33)
MCHC RBC AUTO-ENTMCNC: 31.3 G/DL (ref 31.5–35.7)
MCV RBC AUTO: 78 FL (ref 79–97)
MEGA1 DNA VAG QL NAA+PROBE: NORMAL
MONOCYTES # BLD AUTO: 0.6 X10E3/UL (ref 0.1–0.9)
MONOCYTES NFR BLD AUTO: 9 %
N GONORRHOEA RRNA SPEC QL NAA+PROBE: NORMAL
NEUTROPHILS # BLD AUTO: 3.1 X10E3/UL (ref 1.4–7)
NEUTROPHILS NFR BLD AUTO: 47 %
OTHER STN SPEC: NORMAL
PATH REPORT.FINAL DX SPEC: NORMAL
PLATELET # BLD AUTO: 333 X10E3/UL (ref 150–379)
POTASSIUM SERPL-SCNC: 4.4 MMOL/L (ref 3.5–5.2)
PROT SERPL-MCNC: 6.6 G/DL (ref 6–8.5)
RBC # BLD AUTO: 3.8 X10E6/UL (ref 3.77–5.28)
SODIUM SERPL-SCNC: 137 MMOL/L (ref 134–144)
STAT OF ADQ CVX/VAG CYTO-IMP: NORMAL
T VAGINALIS RRNA SPEC QL NAA+PROBE: NORMAL
TRIGL SERPL-MCNC: 162 MG/DL (ref 0–149)
VLDLC SERPL CALC-MCNC: 32 MG/DL (ref 5–40)
WBC # BLD AUTO: 6.6 X10E3/UL (ref 3.4–10.8)

## 2018-04-16 DIAGNOSIS — E55.9 VITAMIN D DEFICIENCY: Primary | ICD-10-CM

## 2018-04-16 RX ORDER — ERGOCALCIFEROL 1.25 MG/1
50000 CAPSULE ORAL
Qty: 4 CAP | Refills: 2 | Status: SHIPPED | OUTPATIENT
Start: 2018-04-16 | End: 2021-08-17

## 2018-04-17 DIAGNOSIS — B96.89 BV (BACTERIAL VAGINOSIS): Primary | ICD-10-CM

## 2018-04-17 DIAGNOSIS — N76.0 BV (BACTERIAL VAGINOSIS): Primary | ICD-10-CM

## 2018-04-17 RX ORDER — METRONIDAZOLE 500 MG/1
500 TABLET ORAL 2 TIMES DAILY
Qty: 14 TAB | Refills: 0 | Status: SHIPPED | OUTPATIENT
Start: 2018-04-17 | End: 2018-04-24

## 2018-04-20 ENCOUNTER — TELEPHONE (OUTPATIENT)
Dept: FAMILY MEDICINE CLINIC | Age: 33
End: 2018-04-20

## 2018-04-20 ENCOUNTER — APPOINTMENT (OUTPATIENT)
Dept: MAMMOGRAPHY | Age: 33
End: 2018-04-20

## 2018-04-20 ENCOUNTER — HOSPITAL ENCOUNTER (OUTPATIENT)
Dept: ULTRASOUND IMAGING | Age: 33
End: 2018-04-20

## 2018-04-20 NOTE — TELEPHONE ENCOUNTER
Pt called and advised of positive BV results and treatment that has been called in. Pt verbalized understanding.

## 2018-04-27 ENCOUNTER — HOSPITAL ENCOUNTER (OUTPATIENT)
Dept: MAMMOGRAPHY | Age: 33
Discharge: HOME | End: 2018-04-27
Payer: COMMERCIAL

## 2018-04-27 ENCOUNTER — HOSPITAL ENCOUNTER (OUTPATIENT)
Dept: ULTRASOUND IMAGING | Age: 33
Discharge: HOME | End: 2018-04-27
Payer: COMMERCIAL

## 2018-04-27 DIAGNOSIS — N63.0 BREAST LUMP: ICD-10-CM

## 2018-04-27 PROCEDURE — 76642 ULTRASOUND BREAST LIMITED: CPT

## 2018-04-27 PROCEDURE — 77066 DX MAMMO INCL CAD BI: CPT

## 2019-05-13 ENCOUNTER — TELEPHONE (OUTPATIENT)
Dept: FAMILY MEDICINE CLINIC | Age: 34
End: 2019-05-13

## 2019-05-13 NOTE — TELEPHONE ENCOUNTER
Spoke with pt and stated she can call for a same day for her acute issues and scheduled a CPE for a later date. Pt verbalized understanding.

## 2019-05-13 NOTE — TELEPHONE ENCOUNTER
----- Message from René Hanna sent at 5/13/2019 12:24 PM EDT -----  Regarding: NP ELIEL / TELEPHONE  Pt is requesting to speak with nurse in regard to scheduling a CPE sooner than available.  Best Contact: [phone]

## 2019-05-14 ENCOUNTER — OFFICE VISIT (OUTPATIENT)
Dept: FAMILY MEDICINE CLINIC | Age: 34
End: 2019-05-14

## 2019-05-14 VITALS
DIASTOLIC BLOOD PRESSURE: 96 MMHG | HEART RATE: 92 BPM | OXYGEN SATURATION: 98 % | HEIGHT: 63 IN | WEIGHT: 286.6 LBS | SYSTOLIC BLOOD PRESSURE: 144 MMHG | TEMPERATURE: 98.6 F | BODY MASS INDEX: 50.78 KG/M2 | RESPIRATION RATE: 18 BRPM

## 2019-05-14 DIAGNOSIS — H93.11 TINNITUS OF RIGHT EAR: ICD-10-CM

## 2019-05-14 DIAGNOSIS — R53.83 FATIGUE, UNSPECIFIED TYPE: ICD-10-CM

## 2019-05-14 DIAGNOSIS — N92.6 IRREGULAR MENSES: Primary | ICD-10-CM

## 2019-05-14 DIAGNOSIS — Z11.4 ENCOUNTER FOR SCREENING FOR HIV: ICD-10-CM

## 2019-05-14 DIAGNOSIS — J30.2 SEASONAL ALLERGIC RHINITIS, UNSPECIFIED TRIGGER: ICD-10-CM

## 2019-05-14 DIAGNOSIS — E66.01 OBESITY, MORBID (HCC): ICD-10-CM

## 2019-05-14 DIAGNOSIS — R03.0 ELEVATED BLOOD PRESSURE READING: ICD-10-CM

## 2019-05-14 LAB — HBA1C MFR BLD HPLC: 5.9 %

## 2019-05-14 NOTE — PROGRESS NOTES
Chief Complaint Patient presents with  Irregular Menses Started last month and pt bleed (spotting) for an extra week. Pt states this month stopped bleeding and started back after a week bleed for 2 days. Pt did have tubes tied 2005. 1. Have you been to the ER, urgent care clinic since your last visit? Hospitalized since your last visit? No 
 
2. Have you seen or consulted any other health care providers outside of the [address] [place] [address] since your last visit? Include any pap smears or colon screening. No 
 
There are no preventive care reminders to display for this patient.

## 2019-05-14 NOTE — PATIENT INSTRUCTIONS
Fatigue: Care Instructions Your Care Instructions Fatigue is a feeling of tiredness, exhaustion, or lack of energy. You may feel fatigue because of too much or not enough activity. It can also come from stress, lack of sleep, boredom, and poor diet. Many medical problems, such as viral infections, can cause fatigue. Emotional problems, especially depression, are often the cause of fatigue. Fatigue is most often a symptom of another problem. Treatment for fatigue depends on the cause. For example, if you have fatigue because you have a certain health problem, treating this problem also treats your fatigue. If depression or anxiety is the cause, treatment may help. Follow-up care is a key part of your treatment and safety. Be sure to make and go to all appointments, and call your doctor if you are having problems. It's also a good idea to know your test results and keep a list of the medicines you take. How can you care for yourself at home? · Get regular exercise. But don't overdo it. Go back and forth between rest and exercise. · Get plenty of rest. 
· Eat a healthy diet. Do not skip meals, especially breakfast. 
· Reduce your use of caffeine, tobacco, and alcohol. Caffeine is most often found in coffee, tea, cola drinks, and chocolate. · Limit medicines that can cause fatigue. This includes tranquilizers and cold and allergy medicines. When should you call for help? Watch closely for changes in your health, and be sure to contact your doctor if: 
  · You have new symptoms such as fever or a rash.  
  · Your fatigue gets worse.  
  · You have been feeling down, depressed, or hopeless. Or you may have lost interest in things that you usually enjoy.  
  · You are not getting better as expected. Where can you learn more? Go to http://ezra-ezequiel.info/. Enter Y186 in the search box to learn more about \"Fatigue: Care Instructions. \" Current as of: September 23, 2018 Content Version: 11.9 © 4857-8666 [employer]. Care instructions adapted under license by [employer] (which disclaims liability or warranty for this information). If you have questions about a medical condition or this instruction, always ask your healthcare professional. [address] any warranty or liability for your use of this information. Elevated Blood Pressure: Care Instructions Your Care Instructions Blood pressure is a measure of how hard the blood pushes against the walls of your arteries. It's normal for blood pressure to go up and down throughout the day. But if it stays up over time, you have high blood pressure. Two numbers tell you your blood pressure. The first number is the systolic pressure. It shows how hard the blood pushes when your heart is pumping. The second number is the diastolic pressure. It shows how hard the blood pushes between heartbeats, when your heart is relaxed and filling with blood. An ideal blood pressure in adults is less than 120/80 (say \"120 over 80\"). High blood pressure is 140/90 or higher. You have high blood pressure if your top number is 140 or higher or your bottom number is 90 or higher, or both. The main test for high blood pressure is simple, fast, and painless. To diagnose high blood pressure, your doctor will test your blood pressure at different times. After testing your blood pressure, your doctor may ask you to test it again when you are home. If you are diagnosed with high blood pressure, you can work with your doctor to make a long-term plan to manage it. Follow-up care is a key part of your treatment and safety. Be sure to make and go to all appointments, and call your doctor if you are having problems. It's also a good idea to know your test results and keep a list of the medicines you take. How can you care for yourself at home? · Do not smoke. Smoking increases your risk for heart attack and stroke. If you need help quitting, talk to your doctor about stop-smoking programs and medicines. These can increase your chances of quitting for good. · Stay at a healthy weight. · Try to limit how much sodium you eat to less than 2,300 milligrams (mg) a day. Your doctor may ask you to try to eat less than 1,500 mg a day. · Be physically active. Get at least 30 minutes of exercise on most days of the week. Walking is a good choice. You also may want to do other activities, such as running, swimming, cycling, or playing tennis or team sports. · Avoid or limit alcohol. Talk to your doctor about whether you can drink any alcohol. · Eat plenty of fruits, vegetables, and low-fat dairy products. Eat less saturated and total fats. · Learn how to check your blood pressure at home. When should you call for help? Call your doctor now or seek immediate medical care if: 
? · Your blood pressure is much higher than normal (such as 180/110 or higher). ? · You think high blood pressure is causing symptoms such as: ¨ Severe headache. ¨ Blurry vision. ? Watch closely for changes in your health, and be sure to contact your doctor if: 
? · You do not get better as expected. Where can you learn more? Go to http://ezra-ezequiel.info/. Enter W523 in the search box to learn more about \"Elevated Blood Pressure: Care Instructions. \" Current as of: September 21, 2016 Content Version: 11.4 © 8253-9439 [employer]. Care instructions adapted under license by [employer] (which disclaims liability or warranty for this information). If you have questions about a medical condition or this instruction, always ask your healthcare professional. [address] any warranty or liability for your use of this information. DASH Diet: Care Instructions Your Care Instructions The DASH diet is an eating plan that can help lower your blood pressure. DASH stands for Dietary Approaches to Stop Hypertension. Hypertension is high blood pressure. The DASH diet focuses on eating foods that are high in calcium, potassium, and magnesium. These nutrients can lower blood pressure. The foods that are highest in these nutrients are fruits, vegetables, low-fat dairy products, nuts, seeds, and legumes. But taking calcium, potassium, and magnesium supplements instead of eating foods that are high in those nutrients does not have the same effect. The DASH diet also includes whole grains, fish, and poultry. The DASH diet is one of several lifestyle changes your doctor may recommend to lower your high blood pressure. Your doctor may also want you to decrease the amount of sodium in your diet. Lowering sodium while following the DASH diet can lower blood pressure even further than just the DASH diet alone. Follow-up care is a key part of your treatment and safety. Be sure to make and go to all appointments, and call your doctor if you are having problems. It's also a good idea to know your test results and keep a list of the medicines you take. How can you care for yourself at home? Following the DASH diet · Eat 4 to 5 servings of fruit each day. A serving is 1 medium-sized piece of fruit, ½ cup chopped or canned fruit, 1/4 cup dried fruit, or 4 ounces (½ cup) of fruit juice. Choose fruit more often than fruit juice. · Eat 4 to 5 servings of vegetables each day. A serving is 1 cup of lettuce or raw leafy vegetables, ½ cup of chopped or cooked vegetables, or 4 ounces (½ cup) of vegetable juice. Choose vegetables more often than vegetable juice. · Get 2 to 3 servings of low-fat and fat-free dairy each day. A serving is 8 ounces of milk, 1 cup of yogurt, or 1 ½ ounces of cheese. · Eat 6 to 8 servings of grains each day.  A serving is 1 slice of bread, 1 ounce of dry cereal, or ½ cup of cooked rice, pasta, or cooked cereal. Try to choose whole-grain products as much as possible. · Limit lean meat, poultry, and fish to 2 servings each day. A serving is 3 ounces, about the size of a deck of cards. · Eat 4 to 5 servings of nuts, seeds, and legumes (cooked dried beans, lentils, and split peas) each week. A serving is 1/3 cup of nuts, 2 tablespoons of seeds, or ½ cup of cooked beans or peas. · Limit fats and oils to 2 to 3 servings each day. A serving is 1 teaspoon of vegetable oil or 2 tablespoons of salad dressing. · Limit sweets and added sugars to 5 servings or less a week. A serving is 1 tablespoon jelly or jam, ½ cup sorbet, or 1 cup of lemonade. · Eat less than 2,300 milligrams (mg) of sodium a day. If you limit your sodium to 1,500 mg a day, you can lower your blood pressure even more. Tips for success · Start small. Do not try to make dramatic changes to your diet all at once. You might feel that you are missing out on your favorite foods and then be more likely to not follow the plan. Make small changes, and stick with them. Once those changes become habit, add a few more changes. · Try some of the following: ? Make it a goal to eat a fruit or vegetable at every meal and at snacks. This will make it easy to get the recommended amount of fruits and vegetables each day. ? Try yogurt topped with fruit and nuts for a snack or healthy dessert. ? Add lettuce, tomato, cucumber, and onion to sandwiches. ? Combine a ready-made pizza crust with low-fat mozzarella cheese and lots of vegetable toppings. Try using tomatoes, squash, spinach, broccoli, carrots, cauliflower, and onions. ? Have a variety of cut-up vegetables with a low-fat dip as an appetizer instead of chips and dip. ? Sprinkle sunflower seeds or chopped almonds over salads. Or try adding chopped walnuts or almonds to cooked vegetables. ? Try some vegetarian meals using beans and peas. Add garbanzo or kidney beans to salads. Make burritos and tacos with mashed perea beans or black beans. Where can you learn more? Go to http://ezra-ezequiel.info/. Enter O314 in the search box to learn more about \"DASH Diet: Care Instructions. \" Current as of: July 22, 2018 Content Version: 11.9 © 3067-2387 [employer]. Care instructions adapted under license by [employer] (which disclaims liability or warranty for this information). If you have questions about a medical condition or this instruction, always ask your healthcare professional. [place] [address] any warranty or liability for your use of this information. Low Sodium Diet (2,000 Milligram): Care Instructions Your Care Instructions Too much sodium causes your body to hold on to extra water. This can raise your blood pressure and force your heart and kidneys to work harder. In very serious cases, this could cause you to be put in the hospital. It might even be life-threatening. By limiting sodium, you will feel better and lower your risk of serious problems. The most common source of sodium is salt. People get most of the salt in their diet from canned, prepared, and packaged foods. Fast food and restaurant meals also are very high in sodium. Your doctor will probably limit your sodium to less than 2,000 milligrams (mg) a day. This limit counts all the sodium in prepared and packaged foods and any salt you add to your food. Follow-up care is a key part of your treatment and safety. Be sure to make and go to all appointments, and call your doctor if you are having problems. It's also a good idea to know your test results and keep a list of the medicines you take. How can you care for yourself at home? Read food labels · Read labels on cans and food packages.  The labels tell you how much sodium is in each serving. Make sure that you look at the serving size. If you eat more than the serving size, you have eaten more sodium. · Food labels also tell you the Percent Daily Value for sodium. Choose products with low Percent Daily Values for sodium. · Be aware that sodium can come in forms other than salt, including monosodium glutamate (MSG), sodium citrate, and sodium bicarbonate (baking soda). MSG is often added to Asian food. When you eat out, you can sometimes ask for food without MSG or added salt. Buy low-sodium foods · Buy foods that are labeled \"unsalted\" (no salt added), \"sodium-free\" (less than 5 mg of sodium per serving), or \"low-sodium\" (less than 140 mg of sodium per serving). Foods labeled \"reduced-sodium\" and \"light sodium\" may still have too much sodium. Be sure to read the label to see how much sodium you are getting. · Buy fresh vegetables, or frozen vegetables without added sauces. Buy low-sodium versions of canned vegetables, soups, and other canned goods. Prepare low-sodium meals · Cut back on the amount of salt you use in cooking. This will help you adjust to the taste. Do not add salt after cooking. One teaspoon of salt has about 2,300 mg of sodium. · Take the salt shaker off the table. · Flavor your food with garlic, lemon juice, onion, vinegar, herbs, and spices. Do not use soy sauce, lite soy sauce, steak sauce, onion salt, garlic salt, celery salt, mustard, or ketchup on your food. · Use low-sodium salad dressings, sauces, and ketchup. Or make your own salad dressings and sauces without adding salt. · Use less salt (or none) when recipes call for it. You can often use half the salt a recipe calls for without losing flavor. Other foods such as rice, pasta, and grains do not need added salt. · Rinse canned vegetables, and cook them in fresh water. This removes somebut not allof the salt.  
· Avoid water that is naturally high in sodium or that has been treated with water softeners, which add sodium. Call your local water company to find out the sodium content of your water supply. If you buy bottled water, read the label and choose a sodium-free brand. Avoid high-sodium foods · Avoid eating: 
? Smoked, cured, salted, and canned meat, fish, and poultry. ? Ham, gaston, hot dogs, and luncheon meats. ? Regular, hard, and processed cheese and regular peanut butter. ? Crackers with salted tops, and other salted snack foods such as pretzels, chips, and salted popcorn. ? Frozen prepared meals, unless labeled low-sodium. ? Canned and dried soups, broths, and bouillon, unless labeled sodium-free or low-sodium. ? Canned vegetables, unless labeled sodium-free or low-sodium. ? Page Fuss fries, pizza, tacos, and other fast foods. ? Pickles, olives, ketchup, and other condiments, especially soy sauce, unless labeled sodium-free or low-sodium. Where can you learn more? Go to http://ezra-ezequiel.info/. Enter G833 in the search box to learn more about \"Low Sodium Diet (2,000 Milligram): Care Instructions. \" Current as of: March 28, 2018 Content Version: 11.9 © 5703-1300 [employer]. Care instructions adapted under license by [employer] (which disclaims liability or warranty for this information). If you have questions about a medical condition or this instruction, always ask your healthcare professional. [place] [address] any warranty or liability for your use of this information.

## 2019-05-14 NOTE — PROGRESS NOTES
HISTORY OF PRESENT ILLNESS Radha King is a 26 y.o. female. HPI  Patient comes in today for abnormal menses. Last month after menses, spotted for a week after cycle. This month, cycle normal this month, started menses 4/29/19, stopped on 5/4/19, then started bleeding again on 5/11/19. No hx of fibroids. Cycles are usually regular. Cycles have increased from 4 days to 6 days about 6 month ago. Had BTL in 2005. No chance of pregnancy. She is sexually active. No new partners. States she has been hearing ringing in right ear, sounds like a popping sound. Also has been hearing heart beat in right ear. Denies pain. Hx of allergies. Can hear heartbeat in ear. Feeling extremely tired. No Known Allergies Past Medical History:  
Diagnosis Date  Anemia  Breast lump   
 left breast lump, doctor felt lump 4/2018  Mixed incontinence urge and stress  Vitamin D deficiency 4/16/2018 Past Surgical History:  
Procedure Laterality Date  HX TUBAL LIGATION    
 2005 Social History Socioeconomic History  Marital status: SINGLE Spouse name: Not on file  Number of children: Not on file  Years of education: Not on file  Highest education level: Not on file Occupational History  Not on file Social Needs  Financial resource strain: Not on file  Food insecurity:  
  Worry: Not on file Inability: Not on file  Transportation needs:  
  Medical: Not on file Non-medical: Not on file Tobacco Use  Smoking status: Current Every Day Smoker Packs/day: 1.00 Years: 11.00 Pack years: 11.00 Types: Cigarettes  Smokeless tobacco: Never Used Substance and Sexual Activity  Alcohol use: Yes Alcohol/week: 0.6 oz Types: 1 Glasses of wine per week Comment: socially  Drug use: Not Currently Types: Marijuana Comment: once every 3 months  Sexual activity: Yes  
  Partners: Male Birth control/protection: Surgical  
Lifestyle  Physical activity:  
  Days per week: Not on file Minutes per session: Not on file  Stress: Not on file Relationships  Social connections:  
  Talks on phone: Not on file Gets together: Not on file Attends [employer] service: Not on file Active member of club or organization: Not on file Attends meetings of clubs or organizations: Not on file Relationship status: Not on file  Intimate partner violence:  
  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 Family History Problem Relation Age of Onset  No Known Problems Mother  Hypertension Father  Diabetes Sister  No Known Problems Brother  Diabetes Sister  Cancer Maternal Grandfather   
     thyroid  Stroke Maternal Grandmother Current Outpatient Medications Medication Sig  ergocalciferol (ERGOCALCIFEROL) 50,000 unit capsule Take 1 Cap by mouth every seven (7) days. (Patient not taking: Reported on 5/14/2019)  amitriptyline (ELAVIL) 10 mg tablet Take 1 Tab by mouth nightly as needed for Sleep. (Patient not taking: Reported on 5/14/2019) No current facility-administered medications for this visit. Review of Systems Constitutional: Positive for malaise/fatigue. Negative for chills, diaphoresis, fever and weight loss. HENT: Positive for tinnitus. Negative for congestion, ear pain and sore throat. Eyes: Negative for blurred vision. Respiratory: Negative for cough and shortness of breath. Cardiovascular: Negative for chest pain, palpitations and leg swelling. Gastrointestinal: Negative for abdominal pain, nausea and vomiting. Genitourinary: Negative for dysuria, flank pain, frequency, hematuria and urgency. Irregular menses Skin: Negative. Neurological: Negative for dizziness, tingling, sensory change, speech change and headaches. Vitals:  
 05/14/19 1005 05/14/19 1039 BP: (!) 174/104 (!) 144/96 Pulse: 92 Resp: 18 Temp: 98.6 °F (37 °C) TempSrc: Oral   
SpO2: 98% Weight: 286 lb 9.6 oz (130 kg) Height: 5' 3\" (1.6 m) Physical Exam  
Constitutional: She is oriented to person, place, and time. She appears well-developed and well-nourished. She is cooperative. BP elevated HENT:  
Right Ear: Hearing, external ear and ear canal normal. A middle ear effusion (clear fluid bubbles) is present. Left Ear: Hearing, external ear and ear canal normal. A middle ear effusion (clear fluid bubbles) is present. Mouth/Throat: Uvula is midline, oropharynx is clear and moist and mucous membranes are normal.  
Neck: No thyromegaly present. Cardiovascular: Normal rate, regular rhythm and normal heart sounds. Pulses: 
     Dorsalis pedis pulses are 2+ on the right side, and 2+ on the left side. No edema noted Pulmonary/Chest: Effort normal and breath sounds normal.  
Genitourinary:  
Genitourinary Comments: GYN exam deferred, will be done by GYN Neurological: She is alert and oriented to person, place, and time. Skin: Skin is warm and dry. Psychiatric: She has a normal mood and affect. Her behavior is normal. Judgment and thought content normal.  
 
ASSESSMENT and PLAN 
  ICD-10-CM ICD-9-CM 1. Irregular menses N92.6 626.4 REFERRAL TO GYNECOLOGY  
   TSH RFX ON ABNORMAL TO FREE T4  
   CBC WITH AUTOMATED DIFF  
   IRON PROFILE 2.  Fatigue, unspecified type R53.83 780.79 TSH RFX ON ABNORMAL TO FREE T4  
   METABOLIC PANEL, COMPREHENSIVE  
   AMB POC HEMOGLOBIN A1C  
   VITAMIN B12 & FOLATE  
   CBC WITH AUTOMATED DIFF  
   IRON PROFILE  
 3. Elevated blood pressure reading R03.0 796.2 TSH RFX ON ABNORMAL TO FREE T4  
   METABOLIC PANEL, COMPREHENSIVE  
   CBC WITH AUTOMATED DIFF 4. Tinnitus of right ear H93.11 388.30   
5. Seasonal allergic rhinitis, unspecified trigger J30.2 477.9 6. Obesity, morbid ([address] E66.01 278.01   
7. Encounter for screening for HIV Z11.4 V73.89 HIV 1/2 AG/AB, 4TH GENERATION,W RFLX CONFIRM Encounter Diagnoses Name Primary?  Irregular menses Yes  Fatigue, unspecified type  Elevated blood pressure reading  Tinnitus of right ear  Seasonal allergic rhinitis, unspecified trigger  Obesity, morbid ([address]  Encounter for screening for HIV Orders Placed This Encounter  TSH RFX ON ABNORMAL TO FREE T4  
 METABOLIC PANEL, COMPREHENSIVE  VITAMIN B12 & FOLATE  CBC WITH AUTOMATED DIFF  
 IRON PROFILE  
 HIV 1/2 AG/AB, 4TH GENERATION,W RFLX CONFIRM  
 REFERRAL TO GYNECOLOGY  AMB POC HEMOGLOBIN A1C Diagnoses and all orders for this visit: 
 
1. Irregular menses - patient to see GYN 
-     REFERRAL TO GYNECOLOGY 
-     TSH RFX ON ABNORMAL TO FREE T4 
-     CBC WITH AUTOMATED DIFF 
-     IRON PROFILE 2. Fatigue, unspecified type 
-     TSH RFX ON ABNORMAL TO FREE T4 
-     METABOLIC PANEL, COMPREHENSIVE 
-     AMB POC HEMOGLOBIN A1C 
-     VITAMIN B12 & FOLATE 
-     CBC WITH AUTOMATED DIFF 
-     IRON PROFILE 3. Elevated blood pressure reading - Provided and reviewed written patient education on low sodium diet, DASH diet. Encouraged regular physical activity (such as walking for 30 minutes daily for 5 days per week). Patient to [facility] in 1 week for nurse visit for BP check. If no improvement, may need to start BP medication. 
-     TSH RFX ON ABNORMAL TO FREE T4 
-     METABOLIC PANEL, COMPREHENSIVE 
-     CBC WITH AUTOMATED DIFF 4. Tinnitus of right ear - ?secondary to allergies or elevated blood pressure. Patient to take OTC claritin daily.   Patient to work on lowering BP 5. Seasonal allergic rhinitis, unspecified trigger - OTC claritin 6. Obesity, morbid ([facility] [address] -     I have reviewed/discussed the above normal BMI with the patient. I have recommended the following interventions: dietary management education, guidance, and counseling and encourage exercise . Cody Molina 7. Encounter for screening for HIV 
-     HIV 1/2 AG/AB, 4TH GENERATION,W RFLX CONFIRM Follow-up and Dispositions · Return in about 1 week (around 5/21/2019) for nurse visit for BP check. lab results and schedule of future lab studies reviewed with patient I have reviewed the patient's allergies and made any necessary changes. Medical, procedural, social and family histories have been reviewed and updated as medically indicated. I have reconciled and/or revised patient medications in the EMR. I have discussed each diagnosis listed in this note with Alaina Escalona and/or their family. I have discussed treatment options and the risk/benefit analysis of those options, including safe use of medications and possible medication side effects. Through the use of shared decision making we have agreed to the above plan. The patient has received an after-visit summary and questions were answered concerning future plans. Maritza Matute, [email or web address] This note will not be viewable in [address]

## 2019-05-15 DIAGNOSIS — D50.9 IRON DEFICIENCY ANEMIA, UNSPECIFIED IRON DEFICIENCY ANEMIA TYPE: Primary | ICD-10-CM

## 2019-05-15 LAB
ALBUMIN SERPL-MCNC: 3.9 G/DL (ref 3.5–5.5)
ALBUMIN/GLOB SERPL: 1.3 {RATIO} (ref 1.2–2.2)
ALP SERPL-CCNC: 56 IU/L (ref 39–117)
ALT SERPL-CCNC: 10 IU/L (ref 0–32)
AST SERPL-CCNC: 13 IU/L (ref 0–40)
BASOPHILS # BLD AUTO: 0 X10E3/UL (ref 0–0.2)
BASOPHILS NFR BLD AUTO: 0 %
BILIRUB SERPL-MCNC: 0.2 MG/DL (ref 0–1.2)
BUN SERPL-MCNC: 8 MG/DL (ref 6–20)
BUN/CREAT SERPL: 9 (ref 9–23)
CALCIUM SERPL-MCNC: 8.9 MG/DL (ref 8.7–10.2)
CHLORIDE SERPL-SCNC: 107 MMOL/L (ref 96–106)
CO2 SERPL-SCNC: 21 MMOL/L (ref 20–29)
CREAT SERPL-MCNC: 0.94 MG/DL (ref 0.57–1)
EOSINOPHIL # BLD AUTO: 0.1 X10E3/UL (ref 0–0.4)
EOSINOPHIL NFR BLD AUTO: 2 %
ERYTHROCYTE [DISTWIDTH] IN BLOOD BY AUTOMATED COUNT: 17.8 % (ref 12.3–15.4)
FOLATE SERPL-MCNC: 14.7 NG/ML
GLOBULIN SER CALC-MCNC: 2.9 G/DL (ref 1.5–4.5)
GLUCOSE SERPL-MCNC: 105 MG/DL (ref 65–99)
HCT VFR BLD AUTO: 28.7 % (ref 34–46.6)
HGB BLD-MCNC: 8.6 G/DL (ref 11.1–15.9)
HIV 1+2 AB+HIV1 P24 AG SERPL QL IA: NON REACTIVE
IMM GRANULOCYTES # BLD AUTO: 0 X10E3/UL (ref 0–0.1)
IMM GRANULOCYTES NFR BLD AUTO: 0 %
IRON SATN MFR SERPL: 5 % (ref 15–55)
IRON SERPL-MCNC: 23 UG/DL (ref 27–159)
LYMPHOCYTES # BLD AUTO: 2 X10E3/UL (ref 0.7–3.1)
LYMPHOCYTES NFR BLD AUTO: 41 %
MCH RBC QN AUTO: 21.2 PG (ref 26.6–33)
MCHC RBC AUTO-ENTMCNC: 30 G/DL (ref 31.5–35.7)
MCV RBC AUTO: 71 FL (ref 79–97)
MONOCYTES # BLD AUTO: 0.4 X10E3/UL (ref 0.1–0.9)
MONOCYTES NFR BLD AUTO: 9 %
NEUTROPHILS # BLD AUTO: 2.3 X10E3/UL (ref 1.4–7)
NEUTROPHILS NFR BLD AUTO: 48 %
PLATELET # BLD AUTO: 288 X10E3/UL (ref 150–379)
POTASSIUM SERPL-SCNC: 4.5 MMOL/L (ref 3.5–5.2)
PROT SERPL-MCNC: 6.8 G/DL (ref 6–8.5)
RBC # BLD AUTO: 4.05 X10E6/UL (ref 3.77–5.28)
SODIUM SERPL-SCNC: 140 MMOL/L (ref 134–144)
TIBC SERPL-MCNC: 436 UG/DL (ref 250–450)
TSH SERPL DL<=0.005 MIU/L-ACNC: 1.97 UIU/ML (ref 0.45–4.5)
UIBC SERPL-MCNC: 413 UG/DL (ref 131–425)
VIT B12 SERPL-MCNC: 317 PG/ML (ref 232–1245)
WBC # BLD AUTO: 4.8 X10E3/UL (ref 3.4–10.8)

## 2019-05-15 RX ORDER — FERROUS SULFATE 325(65) MG
325 TABLET, DELAYED RELEASE (ENTERIC COATED) ORAL
Qty: 90 TAB | Refills: 5 | Status: SHIPPED | OUTPATIENT
Start: 2019-05-15 | End: 2021-08-17

## 2019-05-15 NOTE — PROGRESS NOTES
RECOMMENDATIONS: 
Hemoglobin A1c (diabetes screening) is slightly elevated, indicating borderline or prediabetes. Please start cutting back on your portions of starchy foods and drinks such as bread, rice, potato, pasta, fruit, sweets, regular soda, sweet tea, juice and any other sweetened beverages. I have enclosed information on diet and lifestyle changes for patients with prediabetes. We will continue to monitor this. Your CBC, which looks at your white blood cells, red blood cells, and hemoglobin came back showing that you have anemia, and your iron profile shows that you are really low in iron. This is most likely why you feel so tired/fatigued. You need to take iron supplementation three times a day with meals. I have sent a prescription to the pharmacy. Try to take with a source of vitamin C like orange juice to help with absorption. Avoid taking it with dairy as that decreases absorption. Major side effects of iron supplementation include constipation so I recommend you eat a lot of leafy green vegetables, increase your fiber, and take an over the counter stool softener. I always recommend adding citrucel/metamucil to your daily routine because not only does it prompt you to drink an extra glass of water but it helps keep your bowels more regular. Thyroid screen negative. Liver and kidney function normal.  Vitamin B12 on low end of normal.  You can take an over-the-counter Vitamin B12 supplement daily. HIV screening negative.

## 2019-05-21 ENCOUNTER — CLINICAL SUPPORT (OUTPATIENT)
Dept: FAMILY MEDICINE CLINIC | Age: 34
End: 2019-05-21

## 2019-05-21 VITALS — SYSTOLIC BLOOD PRESSURE: 137 MMHG | DIASTOLIC BLOOD PRESSURE: 94 MMHG | HEART RATE: 75 BPM

## 2019-05-21 DIAGNOSIS — I10 ESSENTIAL HYPERTENSION WITH GOAL BLOOD PRESSURE LESS THAN 130/80: Primary | ICD-10-CM

## 2019-05-21 RX ORDER — HYDROCHLOROTHIAZIDE 12.5 MG/1
12.5 TABLET ORAL DAILY
Qty: 30 TAB | Refills: 1 | Status: SHIPPED | OUTPATIENT
Start: 2019-05-21 | End: 2021-08-17

## 2019-05-21 NOTE — PROGRESS NOTES
Patient comes in today for nurse visit for BP check    Vitals:    05/21/19 1519   BP: (!) 137/94   Pulse: 75     Started patient on HCTZ 12.5mg. Patient can return in 1-2 weeks for nurse visit for BP check, follow up in office in 6 weeks. Orders Placed This Encounter    hydroCHLOROthiazide (HYDRODIURIL) 12.5 mg tablet     Sig: Take 1 Tab by mouth daily.      Dispense:  30 Tab     Refill:  1

## 2019-12-05 NOTE — LETTER
4/11/2018 4:47 PM 
 
Ms. Ev Lazo [address] [place] [address] To Whom It May Concern, Please provide the following reasonable accommodation for patient to help alleviate back and neck pain and allow her to perform her job duties:  Lumbar support for chair. If you have any questions, please contact the office at [phone]. Sincerely, Zamzam Ragland NP 
 
 No

## 2021-08-09 ENCOUNTER — TELEPHONE (OUTPATIENT)
Dept: FAMILY MEDICINE CLINIC | Age: 36
End: 2021-08-09

## 2021-08-09 NOTE — TELEPHONE ENCOUNTER
Verified patient with two type of identifiers. Pt scheduled for 8/17/21 at 9:30 AM. Pt verbalized understanding.

## 2021-08-09 NOTE — TELEPHONE ENCOUNTER
----- Message from [place] sent at 8/9/2021 12:28 PM EDT -----  Regarding: Dr. Yasmin Stanley  Appointment not available    Caller's first and last name and relationship to patient (if not the patient):  n/a       Best contact number:[phone]      Preferred date and time:  first available      Scheduled appointment date and time:  n/a       Reason for appointment:  headaches      Details to clarify the request:  Usman Ocampo [facility], 1930–1983

## 2021-08-17 ENCOUNTER — OFFICE VISIT (OUTPATIENT)
Dept: FAMILY MEDICINE CLINIC | Age: 36
End: 2021-08-17
Payer: COMMERCIAL

## 2021-08-17 VITALS
HEART RATE: 76 BPM | SYSTOLIC BLOOD PRESSURE: 170 MMHG | DIASTOLIC BLOOD PRESSURE: 110 MMHG | BODY MASS INDEX: 51.91 KG/M2 | TEMPERATURE: 97.4 F | WEIGHT: 293 LBS | HEIGHT: 63 IN | OXYGEN SATURATION: 100 % | RESPIRATION RATE: 12 BRPM

## 2021-08-17 DIAGNOSIS — Z11.3 SCREEN FOR STD (SEXUALLY TRANSMITTED DISEASE): ICD-10-CM

## 2021-08-17 DIAGNOSIS — I16.0 HYPERTENSIVE URGENCY: Primary | ICD-10-CM

## 2021-08-17 DIAGNOSIS — E55.9 VITAMIN D DEFICIENCY: ICD-10-CM

## 2021-08-17 DIAGNOSIS — R00.2 PALPITATIONS: ICD-10-CM

## 2021-08-17 DIAGNOSIS — Z11.4 ENCOUNTER FOR SCREENING FOR HIV: ICD-10-CM

## 2021-08-17 DIAGNOSIS — Z11.59 NEED FOR HEPATITIS C SCREENING TEST: ICD-10-CM

## 2021-08-17 DIAGNOSIS — E66.01 OBESITY, MORBID (HCC): ICD-10-CM

## 2021-08-17 PROBLEM — I10 HYPERTENSION: Status: ACTIVE | Noted: 2021-08-17

## 2021-08-17 PROCEDURE — 93000 ELECTROCARDIOGRAM COMPLETE: CPT | Performed by: NURSE PRACTITIONER

## 2021-08-17 PROCEDURE — 99214 OFFICE O/P EST MOD 30 MIN: CPT | Performed by: NURSE PRACTITIONER

## 2021-08-17 RX ORDER — AMLODIPINE BESYLATE 10 MG/1
10 TABLET ORAL DAILY
Qty: 90 TABLET | Refills: 1 | Status: SHIPPED | OUTPATIENT
Start: 2021-08-17 | End: 2021-11-22 | Stop reason: SDUPTHER

## 2021-08-17 NOTE — PATIENT INSTRUCTIONS
Acute High Blood Pressure: Care Instructions  Your Care Instructions     Acute high blood pressure is very high blood pressure. It's a serious problem. Very high blood pressure can damage your brain, heart, eyes, and kidneys. You may have been given medicines to lower your blood pressure. You may have gotten them as pills or through a needle in one of your veins. This is called an IV. And maybe you were given other medicines too. These can be needed when high blood pressure causes other problems. To keep your blood pressure at a lower level, you may need to make healthy lifestyle changes. And you will probably need to take medicines. Be sure to follow up with your doctor about your blood pressure and what you can do about it. Follow-up care is a key part of your treatment and safety. Be sure to make and go to all appointments, and call your doctor if you are having problems. It's also a good idea to know your test results and keep a list of the medicines you take. How can you care for yourself at home? · See your doctor as often as he or she recommends. This is to make sure your blood pressure is under control. · Take your blood pressure medicine exactly as prescribed. You may take one or more types. They include diuretics, beta-blockers, ACE inhibitors, calcium channel blockers, and angiotensin II receptor blockers. Call your doctor if you think you are having a problem with your medicine. · If you take blood pressure medicine, talk to your doctor before you take decongestants or anti-inflammatory medicine, such as ibuprofen. These can raise blood pressure. · Learn how to check your blood pressure at home. Check it often. · Ask your doctor if it's okay to drink alcohol. · Talk to your doctor about lifestyle changes that can help blood pressure. These include being active and managing your weight. · Don't smoke. Smoking increases your risk for heart attack and stroke.   When should you call for help?   Call  911 anytime you think you may need emergency care. This may mean having symptoms that suggest that your blood pressure is causing a serious heart or blood vessel problem. Your blood pressure may be over 180/120. For example, call 911 if:    · You have symptoms of a heart attack. These may include:  ? Chest pain or pressure, or a strange feeling in the chest.  ? Sweating. ? Shortness of breath. ? Nausea or vomiting. ? Pain, pressure, or a strange feeling in the back, neck, jaw, or upper belly or in one or both shoulders or arms. ? Lightheadedness or sudden weakness. ? A fast or irregular heartbeat.     · You have symptoms of a stroke. These may include:  ? Sudden numbness, tingling, weakness, or loss of movement in your face, arm, or leg, especially on only one side of your body. ? Sudden vision changes. ? Sudden trouble speaking. ? Sudden confusion or trouble understanding simple statements. ? Sudden problems with walking or balance. ? A sudden, severe headache that is different from past headaches.     · You have severe back or belly pain. Do not wait until your blood pressure comes down on its own. Get help right away. Call your doctor now or seek immediate care if:    · Your blood pressure is much higher than normal (such as 180/120 or higher), but you don't have symptoms.     · You think high blood pressure is causing symptoms, such as:  ? Severe headache.  ? Blurry vision. Watch closely for changes in your health, and be sure to contact your doctor if:    · Your blood pressure measures higher than your doctor recommends at least 2 times. That means the top number is higher or the bottom number is higher, or both.     · You think you may be having side effects from your blood pressure medicine. Where can you learn more? Go to http://www.gray.com/  Enter H919 in the search box to learn more about \"Acute High Blood Pressure: Care Instructions. \"  Current as of: August 31, 2020               Content Version: 12.8  © 2006-2021 [employer]. Care instructions adapted under license by [employer] (which disclaims liability or warranty for this information). If you have questions about a medical condition or this instruction, always ask your healthcare professional. [address] any warranty or liability for your use of this information. Low Sodium Diet (2,000 Milligram): Care Instructions  Overview     Limiting sodium can be an important part of managing some health problems. The most common source of sodium is salt. People get most of the salt in their diet from canned, prepared, and packaged foods. Fast food and restaurant meals also are very high in sodium. Your doctor will probably limit your sodium to less than 2,000 milligrams (mg) a day. This limit counts all the sodium in prepared and packaged foods and any salt you add to your food. Follow-up care is a key part of your treatment and safety. Be sure to make and go to all appointments, and call your doctor if you are having problems. It's also a good idea to know your test results and keep a list of the medicines you take. How can you care for yourself at home? Read food labels  · Read labels on cans and food packages. The labels tell you how much sodium is in each serving. Make sure that you look at the serving size. If you eat more than the serving size, you have eaten more sodium. · Food labels also tell you the Percent Daily Value for sodium. Choose products with low Percent Daily Values for sodium. · Be aware that sodium can come in forms other than salt, including monosodium glutamate (MSG), sodium citrate, and sodium bicarbonate (baking soda). MSG is often added to Asian food. When you eat out, you can sometimes ask for food without MSG or added salt.   Buy low-sodium foods  · Buy foods that are labeled \"unsalted\" (no salt added), \"sodium-free\" (less than 5 mg of sodium per serving), or \"low-sodium\" (140 mg or less of sodium per serving). Foods labeled \"reduced-sodium\" and \"light sodium\" may still have too much sodium. Be sure to read the label to see how much sodium you are getting. · Buy fresh vegetables, or frozen vegetables without added sauces. Buy low-sodium versions of canned vegetables, soups, and other canned goods. Prepare low-sodium meals  · Cut back on the amount of salt you use in cooking. This will help you adjust to the taste. Do not add salt after cooking. One teaspoon of salt has about 2,300 mg of sodium. · Take the salt shaker off the table. · Flavor your food with garlic, lemon juice, onion, vinegar, herbs, and spices. Do not use soy sauce, lite soy sauce, steak sauce, onion salt, garlic salt, celery salt, or ketchup on your food. · Use low-sodium salad dressings, sauces, and ketchup. Or make your own salad dressings and sauces without adding salt. · Use less salt (or none) when recipes call for it. You can often use half the salt a recipe calls for without losing flavor. Other foods such as rice, pasta, and grains do not need added salt. · Rinse canned vegetables, and cook them in fresh water. This removes somebut not allof the salt. · Avoid water that is naturally high in sodium or that has been treated with water softeners, which add sodium. If you buy bottled water, read the label and choose a sodium-free brand. Avoid high-sodium foods  · Avoid eating:  ? Smoked, cured, salted, and canned meat, fish, and poultry. ? Ham, gaston, hot dogs, and luncheon meats. ? Regular, hard, and processed cheese and regular peanut butter. ? Crackers with salted tops, and other salted snack foods such as pretzels, chips, and salted popcorn. ? Frozen prepared meals, unless labeled low-sodium. ? Canned and dried soups, broths, and bouillon, unless labeled sodium-free or low-sodium. ?  Canned vegetables, unless labeled sodium-free or low-sodium. ? Western Elisha fries, pizza, tacos, and other fast foods. ? Pickles, olives, ketchup, and other condiments, especially soy sauce, unless labeled sodium-free or low-sodium. Where can you learn more? Go to http://www.gray.com/  Enter V843 in the search box to learn more about \"Low Sodium Diet (2,000 Milligram): Care Instructions. \"  Current as of: December 17, 2020               Content Version: 12.8  © 7778-9382 [employer]. Care instructions adapted under license by [employer] (which disclaims liability or warranty for this information). If you have questions about a medical condition or this instruction, always ask your healthcare professional. [address] any warranty or liability for your use of this information.

## 2021-08-17 NOTE — PROGRESS NOTES
Rigo West (: 1985) is a 43 y.o. female, established patient, here for evaluation of the following chief complaint(s):  Migraine       ASSESSMENT/PLAN:  Below is the assessment and plan developed based on review of pertinent history, physical exam, labs, studies, and medications. 1. Hypertensive urgency - has not been on BP meds for 1 year. Will start amlodipine, follow up in 1 week for BP check. Check labs  -     CBC WITH AUTOMATED DIFF; Future  -     METABOLIC PANEL, COMPREHENSIVE; Future  -     LIPID PANEL; Future  -     MAGNESIUM; Future  -     TSH 3RD GENERATION; Future  -     URINALYSIS W/ REFLEX CULTURE; Future  -     amLODIPine (NORVASC) 10 mg tablet; Take 1 Tablet by mouth daily. , Normal, Disp-90 Tablet, R-1  2. Palpitations - typically anxious when has palpitations,  Will check EKG, labs,    -     AMB POC EKG ROUTINE W/ 12 LEADS, INTER & REP  -     CBC WITH AUTOMATED DIFF; Future  -     METABOLIC PANEL, COMPREHENSIVE; Future  -     LIPID PANEL; Future  -     MAGNESIUM; Future  -     TSH 3RD GENERATION; Future  3. Obesity, morbid ([address]  -     HEMOGLOBIN A1C WITH EAG; Future  -     LIPID PANEL; Future  4. Vitamin D deficiency  -     METABOLIC PANEL, COMPREHENSIVE; Future  -     VITAMIN D, 25 HYDROXY; Future  5. Encounter for screening for HIV  -     HIV 1/2 AG/AB, 4TH GENERATION,W RFLX CONFIRM; Future  6. Screen for STD (sexually transmitted disease)  -     CT/NG/T.VAGINALIS AMPLIFICATION; Future  -     T PALLIDUM AB; Future  7. Need for hepatitis C screening test  -     HEPATITIS C AB; Future      Return in about 1 week (around 2021). SUBJECTIVE/OBJECTIVE:  HPI  patient comes in today for headaches. Has been having headaches for 2 weeks. If she lays on back of head, she feels dizzy. Will take OTC meds with some relief. Denies chest pains, dyspnea. Occasional palpitations, occurs twice weekly, will last less than 1 minutes.   Does feel a little anxious when having palpitations. No recent eye exam.  Current smoker, about 1 ppd. When she is stressed she eats. Interested in gastric sleeve. Was overweight as a kid. Has struggled with weight loss all her life. Has done weight watchers, keto (caused constipation), low carb diets. She tends to eat out, does not like to cook. She lays down all day - not active. Was put on HCTZ in 2019, once she ran out of refills, she just assumed she didn't need to to take anymore  Had hysterectomy, still has ovaries. No Known Allergies    Past Medical History:   Diagnosis Date    Anemia     Breast lump     left breast lump, doctor felt lump 4/2018    Hypertension 8/17/2021    Mixed incontinence urge and stress     Vitamin D deficiency 4/16/2018       Past Surgical History:   Procedure Laterality Date    HX PARTIAL HYSTERECTOMY      left ovaries     HX TUBAL LIGATION      2005       Social History     Socioeconomic History    Marital status: SINGLE     Spouse name: Not on file    Number of children: Not on file    Years of education: Not on file    Highest education level: Not on file   Occupational History    Not on file   Tobacco Use    Smoking status: Current Every Day Smoker     Packs/day: 1.00     Years: 11.00     Pack years: 11.00     Types: Cigarettes    Smokeless tobacco: Never Used   Vaping Use    Vaping Use: Never used   Substance and Sexual Activity    Alcohol use: Yes     Alcohol/week: 1.0 standard drinks     Types: 1 Glasses of wine per week     Comment: socially    Drug use: Yes     Types: Marijuana     Comment: socially    Sexual activity: Yes     Partners: Male     Birth control/protection: Surgical   Other Topics Concern    Not on file   Social History Narrative    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        Social Determinants of Health     Financial Resource Strain:     Difficulty of Paying Living Expenses:    Food Insecurity:     Worried About Running Out of Food in the Last Year:     Ran Out of Food in the Last Year:    Transportation Needs:     Lack of Transportation (Medical):  Lack of Transportation (Non-Medical):    Physical Activity:     Days of Exercise per Week:     Minutes of Exercise per Session:    Stress:     Feeling of Stress :    Social Connections:     Frequency of Communication with Friends and Family:     Frequency of Social Gatherings with Friends and Family:     Attends Buddhist Services:     Active Member of Clubs or Organizations:     Attends Club or Organization Meetings:     Marital Status:    Intimate Partner Violence:     Fear of Current or Ex-Partner:     Emotionally Abused:     Physically Abused:     Sexually Abused:        Family History   Problem Relation Age of Onset    No Known Problems Mother     Hypertension Father     Diabetes Sister     Hypertension Sister     No Known Problems Brother     Diabetes Sister     Cancer Maternal Grandfather         thyroid    Stroke Maternal Grandmother        Current Outpatient Medications   Medication Sig    amLODIPine (NORVASC) 10 mg tablet Take 1 Tablet by mouth daily. No current facility-administered medications for this visit. Review of Systems   Constitutional: Negative for chills, diaphoresis, fatigue, fever and unexpected weight change. Respiratory: Negative for cough and shortness of breath. Cardiovascular: Negative for chest pain, palpitations and leg swelling. Gastrointestinal: Negative for abdominal pain, blood in stool, constipation, diarrhea, nausea and vomiting. Endocrine: Negative for cold intolerance, heat intolerance, polydipsia, polyphagia and polyuria. Genitourinary: Negative for dysuria, flank pain, frequency, hematuria and urgency. Musculoskeletal: Negative for back pain and myalgias. Skin: Negative. Neurological: Positive for headaches. Negative for dizziness, speech difficulty, light-headedness and numbness.    Psychiatric/Behavioral: Negative for dysphoric mood and sleep disturbance. The patient is not nervous/anxious. Vitals:    08/17/21 0959 08/17/21 1045   BP: (!) 187/125 (!) 170/110   Pulse: 76    Resp: 12    Temp: 97.4 °F (36.3 °C)    TempSrc: Oral    SpO2: 100%    Weight: 315 lb 3.2 oz (143 kg)    Height: 5' 3\" (1.6 m)      Physical Exam  Vitals reviewed. Constitutional:       Appearance: Normal appearance. She is well-developed and well-groomed. She is morbidly obese. HENT:      Right Ear: Hearing normal.      Left Ear: Hearing normal.   Neck:      Thyroid: No thyromegaly. Cardiovascular:      Rate and Rhythm: Normal rate and regular rhythm. Pulses:           Dorsalis pedis pulses are 2+ on the right side and 2+ on the left side. Heart sounds: Normal heart sounds, S1 normal and S2 normal.   Pulmonary:      Effort: Pulmonary effort is normal.      Breath sounds: Normal breath sounds. Musculoskeletal:      Right lower leg: No edema. Left lower leg: No edema. Skin:     General: Skin is warm and dry. Neurological:      Mental Status: She is alert and oriented to person, place, and time. Psychiatric:         Attention and Perception: Attention normal.         Mood and Affect: Mood and affect normal.         Speech: Speech normal.         Behavior: Behavior normal. Behavior is cooperative. Thought Content: Thought content normal.         Cognition and Memory: Cognition and memory normal.           On this date 08/17/2021 I have spent 30 minutes reviewing previous notes, test results and face to face with the patient discussing the diagnosis and importance of compliance with the treatment plan as well as documenting on the day of the visit. An electronic signature was used to authenticate this note.   -- Wero Sánchez NP

## 2021-08-17 NOTE — PROGRESS NOTES
Chief Complaint   Patient presents with    Migraine       1. Have you been to the ER, urgent care clinic since your last visit? Hospitalized since your last visit? No    2. Have you seen or consulted any other health care providers outside of the [address] [place] [address] since your last visit? Include any pap smears or colon screening. Yes, [facility] for partial hysterectomy.      Health Maintenance Due   Topic Date Due    Hepatitis C Screening  Never done    Pneumococcal 0-64 years (1 of 2 - PPSV23) Never done    PAP AKA CERVICAL CYTOLOGY  04/11/2021

## 2021-08-18 LAB
25(OH)D3 SERPL-MCNC: 10.6 NG/ML (ref 30–100)
ALBUMIN SERPL-MCNC: 3.5 G/DL (ref 3.5–5)
ALBUMIN/GLOB SERPL: 1 {RATIO} (ref 1.1–2.2)
ALP SERPL-CCNC: 68 U/L (ref 45–117)
ALT SERPL-CCNC: 20 U/L (ref 12–78)
ANION GAP SERPL CALC-SCNC: 5 MMOL/L (ref 5–15)
APPEARANCE UR: CLEAR
AST SERPL-CCNC: 12 U/L (ref 15–37)
BACTERIA URNS QL MICRO: NEGATIVE /HPF
BASOPHILS # BLD: 0 K/UL (ref 0–0.1)
BASOPHILS NFR BLD: 1 % (ref 0–1)
BILIRUB SERPL-MCNC: 0.4 MG/DL (ref 0.2–1)
BILIRUB UR QL: NEGATIVE
BUN SERPL-MCNC: 12 MG/DL (ref 6–20)
BUN/CREAT SERPL: 12 (ref 12–20)
C TRACH RRNA SPEC QL NAA+PROBE: NEGATIVE
CALCIUM SERPL-MCNC: 8.7 MG/DL (ref 8.5–10.1)
CHLORIDE SERPL-SCNC: 109 MMOL/L (ref 97–108)
CHOLEST SERPL-MCNC: 220 MG/DL
CO2 SERPL-SCNC: 27 MMOL/L (ref 21–32)
COLOR UR: ABNORMAL
CREAT SERPL-MCNC: 1.04 MG/DL (ref 0.55–1.02)
DIFFERENTIAL METHOD BLD: NORMAL
EOSINOPHIL # BLD: 0.1 K/UL (ref 0–0.4)
EOSINOPHIL NFR BLD: 1 % (ref 0–7)
EPITH CASTS URNS QL MICRO: ABNORMAL /LPF
ERYTHROCYTE [DISTWIDTH] IN BLOOD BY AUTOMATED COUNT: 13.3 % (ref 11.5–14.5)
EST. AVERAGE GLUCOSE BLD GHB EST-MCNC: 128 MG/DL
GLOBULIN SER CALC-MCNC: 3.4 G/DL (ref 2–4)
GLUCOSE SERPL-MCNC: 115 MG/DL (ref 65–100)
GLUCOSE UR STRIP.AUTO-MCNC: 100 MG/DL
HBA1C MFR BLD: 6.1 % (ref 4–5.6)
HCT VFR BLD AUTO: 41.1 % (ref 35–47)
HCV AB SERPL QL IA: NONREACTIVE
HDLC SERPL-MCNC: 43 MG/DL
HDLC SERPL: 5.1 {RATIO} (ref 0–5)
HGB BLD-MCNC: 13.2 G/DL (ref 11.5–16)
HGB UR QL STRIP: NEGATIVE
HIV 1+2 AB+HIV1 P24 AG SERPL QL IA: NONREACTIVE
HIV12 RESULT COMMENT, HHIVC: NORMAL
IMM GRANULOCYTES # BLD AUTO: 0 K/UL (ref 0–0.04)
IMM GRANULOCYTES NFR BLD AUTO: 0 % (ref 0–0.5)
KETONES UR QL STRIP.AUTO: NEGATIVE MG/DL
LDLC SERPL CALC-MCNC: 150.2 MG/DL (ref 0–100)
LEUKOCYTE ESTERASE UR QL STRIP.AUTO: NEGATIVE
LYMPHOCYTES # BLD: 2.2 K/UL (ref 0.8–3.5)
LYMPHOCYTES NFR BLD: 39 % (ref 12–49)
MAGNESIUM SERPL-MCNC: 2.2 MG/DL (ref 1.6–2.4)
MCH RBC QN AUTO: 29.8 PG (ref 26–34)
MCHC RBC AUTO-ENTMCNC: 32.1 G/DL (ref 30–36.5)
MCV RBC AUTO: 92.8 FL (ref 80–99)
MONOCYTES # BLD: 0.4 K/UL (ref 0–1)
MONOCYTES NFR BLD: 7 % (ref 5–13)
N GONORRHOEA RRNA SPEC QL NAA+PROBE: NEGATIVE
NEUTS SEG # BLD: 3 K/UL (ref 1.8–8)
NEUTS SEG NFR BLD: 52 % (ref 32–75)
NITRITE UR QL STRIP.AUTO: NEGATIVE
NRBC # BLD: 0 K/UL (ref 0–0.01)
NRBC BLD-RTO: 0 PER 100 WBC
PH UR STRIP: 7 [PH] (ref 5–8)
PLATELET # BLD AUTO: 274 K/UL (ref 150–400)
PMV BLD AUTO: 10.9 FL (ref 8.9–12.9)
POTASSIUM SERPL-SCNC: 4.1 MMOL/L (ref 3.5–5.1)
PROT SERPL-MCNC: 6.9 G/DL (ref 6.4–8.2)
PROT UR STRIP-MCNC: ABNORMAL MG/DL
RBC # BLD AUTO: 4.43 M/UL (ref 3.8–5.2)
RBC #/AREA URNS HPF: ABNORMAL /HPF (ref 0–5)
SODIUM SERPL-SCNC: 141 MMOL/L (ref 136–145)
SP GR UR REFRACTOMETRY: 1.02 (ref 1–1.03)
SPECIMEN SOURCE: NORMAL
T VAGINALIS RRNA VAG QL NAA+PROBE: NEGATIVE
TRIGL SERPL-MCNC: 134 MG/DL (ref ?–150)
TSH SERPL DL<=0.05 MIU/L-ACNC: 2.13 UIU/ML (ref 0.36–3.74)
UA: UC IF INDICATED,UAUC: ABNORMAL
UROBILINOGEN UR QL STRIP.AUTO: 1 EU/DL (ref 0.2–1)
VLDLC SERPL CALC-MCNC: 26.8 MG/DL
WBC # BLD AUTO: 5.7 K/UL (ref 3.6–11)
WBC URNS QL MICRO: ABNORMAL /HPF (ref 0–4)

## 2021-08-19 LAB — T PALLIDUM AB SER QL IA: NON REACTIVE

## 2021-08-19 NOTE — PROGRESS NOTES
Hemoglobin A1c (diabetes screening) is slightly elevated, indicating borderline or prediabetes. Please start cutting back on your portions of starchy foods and drinks such as bread, rice, potato, pasta, fruit, sweets, regular soda, sweet tea, juice and any other sweetened beverages. We will continue to monitor this. Thyroid screen negative. Liver and kidney function normal.  Blood counts normal (not anemic). HIV, hepatitis C, and STD screening negative    Vitamin D is low. Please fill prescription for Vitamin D to take once weekly    Cholesterol numbers are elevated. LDL, or bad cholesterol is high. This is the cholesterol that forms plaque in the arteries that leads to stroke and heart attack. You can further reduce your risk by quitting smoking. I would like for you to start taking a cholesterol medication daily to reduce your risk of stroke and heart attack. Work on diet and exercise. No cause for palpitations noted in labs. Can refer to cardiology for further evaluation.

## 2021-08-23 ENCOUNTER — TELEPHONE (OUTPATIENT)
Dept: FAMILY MEDICINE CLINIC | Age: 36
End: 2021-08-23

## 2021-08-23 DIAGNOSIS — E55.9 VITAMIN D DEFICIENCY: Primary | ICD-10-CM

## 2021-08-23 RX ORDER — ERGOCALCIFEROL 1.25 MG/1
50000 CAPSULE ORAL
Qty: 12 CAPSULE | Refills: 3 | Status: SHIPPED | OUTPATIENT
Start: 2021-08-23 | End: 2021-11-29 | Stop reason: SDUPTHER

## 2021-08-23 RX ORDER — ATORVASTATIN CALCIUM 20 MG/1
20 TABLET, FILM COATED ORAL DAILY
Qty: 90 TABLET | Refills: 3 | Status: SHIPPED | OUTPATIENT
Start: 2021-08-23 | End: 2021-11-22 | Stop reason: SDUPTHER

## 2021-08-23 NOTE — TELEPHONE ENCOUNTER
Orders Placed This Encounter    ergocalciferol (ERGOCALCIFEROL) 1,250 mcg (50,000 unit) capsule     Sig: Take 1 Capsule by mouth every seven (7) days. Dispense:  12 Capsule     Refill:  3    atorvastatin (LIPITOR) 20 mg tablet     Sig: Take 1 Tablet by mouth daily.      Dispense:  90 Tablet     Refill:  3

## 2021-08-23 NOTE — TELEPHONE ENCOUNTER
----- Message from Paris Conde NP sent at 8/18/2021 10:10 PM EDT -----  Hemoglobin A1c (diabetes screening) is slightly elevated, indicating borderline or prediabetes. Please start cutting back on your portions of starchy foods and drinks such as bread, rice, potato, pasta, fruit, sweets, regular soda, sweet tea, juice and any other sweetened beverages. We will continue to monitor this. Thyroid screen negative. Liver and kidney function normal.  Blood counts normal (not anemic). HIV, hepatitis C, and STD screening negative    Vitamin D is low. Please fill prescription for Vitamin D to take once weekly    Cholesterol numbers are elevated. LDL, or bad cholesterol is high. This is the cholesterol that forms plaque in the arteries that leads to stroke and heart attack. You can further reduce your risk by quitting smoking. I would like for you to start taking a cholesterol medication daily to reduce your risk of stroke and heart attack. Work on diet and exercise. No cause for palpitations noted in labs. Can refer to cardiology for further evaluation.

## 2021-08-23 NOTE — TELEPHONE ENCOUNTER
Verified patient with two type of identifiers. Informed pt of lab results and/or prescription(s). Pt verbalized understanding.      Pt willing to start chol medication and vit D.     Pt states does not need referral at this time as the palpitations have stopped and she thinks it was her anxiety being high due to headaches and high BP.

## 2021-08-24 ENCOUNTER — CLINICAL SUPPORT (OUTPATIENT)
Dept: FAMILY MEDICINE CLINIC | Age: 36
End: 2021-08-24

## 2021-08-24 VITALS — HEART RATE: 94 BPM | SYSTOLIC BLOOD PRESSURE: 148 MMHG | DIASTOLIC BLOOD PRESSURE: 101 MMHG

## 2021-08-24 DIAGNOSIS — I10 ESSENTIAL HYPERTENSION: Primary | ICD-10-CM

## 2021-08-27 RX ORDER — HYDROCHLOROTHIAZIDE 25 MG/1
25 TABLET ORAL DAILY
Qty: 90 TABLET | Refills: 2 | Status: SHIPPED | OUTPATIENT
Start: 2021-08-27 | End: 2021-11-22 | Stop reason: SDUPTHER

## 2021-08-28 NOTE — PROGRESS NOTES
Munir Rodriguez (: 1985) is a 43 y.o. female, established patient, here for evaluation of the following chief complaint(s):  Blood Pressure Check     Vitals:    21 1455   BP: (!) 148/101   Pulse: 94       ASSESSMENT/PLAN:  Below is the assessment and plan developed based on review of pertinent history, physical exam, labs, studies, and medications. 1. Essential hypertension - BP still elevated on nurse visit today - add HCTZ. Continue amlodipine as well  Follow up scheduled   -     hydroCHLOROthiazide (HYDRODIURIL) 25 mg tablet; Take 1 Tablet by mouth daily. , Normal, Disp-90 Tablet, R-2    An electronic signature was used to authenticate this note.   -- Yuliet Ceron, NP

## 2021-09-28 ENCOUNTER — OFFICE VISIT (OUTPATIENT)
Dept: FAMILY MEDICINE CLINIC | Age: 36
End: 2021-09-28
Payer: COMMERCIAL

## 2021-09-28 VITALS
BODY MASS INDEX: 51.91 KG/M2 | RESPIRATION RATE: 12 BRPM | DIASTOLIC BLOOD PRESSURE: 98 MMHG | HEART RATE: 99 BPM | SYSTOLIC BLOOD PRESSURE: 148 MMHG | HEIGHT: 63 IN | WEIGHT: 293 LBS | TEMPERATURE: 98.4 F | OXYGEN SATURATION: 99 %

## 2021-09-28 DIAGNOSIS — I10 ESSENTIAL HYPERTENSION: Primary | ICD-10-CM

## 2021-09-28 PROCEDURE — 99213 OFFICE O/P EST LOW 20 MIN: CPT | Performed by: NURSE PRACTITIONER

## 2021-09-28 RX ORDER — LOSARTAN POTASSIUM 50 MG/1
50 TABLET ORAL DAILY
Qty: 90 TABLET | Refills: 1 | Status: SHIPPED | OUTPATIENT
Start: 2021-09-28 | End: 2021-11-29 | Stop reason: SDUPTHER

## 2021-09-28 NOTE — PROGRESS NOTES
Chief Complaint   Patient presents with    Hypertension       1. Have you been to the ER, urgent care clinic since your last visit? Hospitalized since your last visit? No    2. Have you seen or consulted any other health care providers outside of the [address] [place] [address] since your last visit? Include any pap smears or colon screening.  No     Flu shot - Pt declined    Health Maintenance Due   Topic Date Due    Pneumococcal 0-64 years (1 of 2 - PPSV23) Never done    Flu Vaccine (1) 09/01/2021

## 2021-09-28 NOTE — PROGRESS NOTES
Karyn Haas (: 1985) is a 43 y.o. female, established patient, here for evaluation of the following chief complaint(s):  Hypertension       ASSESSMENT/PLAN:  Below is the assessment and plan developed based on review of pertinent history, physical exam, labs, studies, and medications. 1. Essential hypertension - continue amlodipine, HCTZ. Add losartan. Return in 1 month for BP check nurse visit and follow up in office in 3 months,  -     losartan (COZAAR) 50 mg tablet; Take 1 Tablet by mouth daily. , Normal, Disp-90 Tablet, R-1      Return in about 3 months (around 2021), or if symptoms worsen or fail to improve. SUBJECTIVE/OBJECTIVE:  HPI  Patient comes in today for follow up HTN  Patient was seen on 21 for headaches, hypertensive urgency. She had not been on BP medication for 1 year. Started on amlodipine 10mg. She returned to office in 1 week, BP was still elevated. Added HCTZ 25mg. She comes in today 6 weeks later and BP still elevated. Ate some chicken salad, but does not think it was salty. States headaches are better. Denies any more palpitations. Denies dizziness, chest pains.   No Known Allergies    Past Medical History:   Diagnosis Date    Anemia     Breast lump     left breast lump, doctor felt lump 2018    Hypertension 2021    Mixed incontinence urge and stress     Vitamin D deficiency 2018       Past Surgical History:   Procedure Laterality Date    HX PARTIAL HYSTERECTOMY      left ovaries     HX TUBAL LIGATION             Social History     Socioeconomic History    Marital status: SINGLE     Spouse name: Not on file    Number of children: Not on file    Years of education: Not on file    Highest education level: Not on file   Occupational History    Not on file   Tobacco Use    Smoking status: Current Every Day Smoker     Packs/day: 1.00     Years: 11.00     Pack years: 11.00     Types: Cigarettes    Smokeless tobacco: Never Used Vaping Use    Vaping Use: Never used   Substance and Sexual Activity    Alcohol use: Yes     Alcohol/week: 1.0 standard drinks     Types: 1 Glasses of wine per week     Comment: socially    Drug use: Yes     Types: Marijuana     Comment: socially    Sexual activity: Yes     Partners: Male     Birth control/protection: Surgical   Other Topics Concern    Not on file   Social History Narrative    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 Social Determinants of Health     Financial Resource Strain:     Difficulty of Paying Living Expenses:    Food Insecurity:     Worried About Running Out of Food in the Last Year:     [address] in the Last Year:    Transportation Needs:     Lack of Transportation (Medical):  Lack of Transportation (Non-Medical):    Physical Activity:     Days of Exercise per Week:     Minutes of Exercise per Session:    Stress:     Feeling of Stress :    Social Connections:     Frequency of Communication with Friends and Family:     Frequency of Social Gatherings with Friends and Family:     Attends Evangelical Services:     Active Member of Clubs or Organizations:     Attends Club or Organization Meetings:     Marital Status:    Intimate Partner Violence:     Fear of Current or Ex-Partner:     Emotionally Abused:     Physically Abused:     Sexually Abused:        Family History   Problem Relation Age of Onset    No Known Problems Mother     Hypertension Father     Diabetes Sister     Hypertension Sister     No Known Problems Brother     Diabetes Sister     Cancer Maternal Grandfather         thyroid    Stroke Maternal Grandmother        Current Outpatient Medications   Medication Sig    losartan (COZAAR) 50 mg tablet Take 1 Tablet by mouth daily.  hydroCHLOROthiazide (HYDRODIURIL) 25 mg tablet Take 1 Tablet by mouth daily.     ergocalciferol (ERGOCALCIFEROL) 1,250 mcg (50,000 unit) capsule Take 1 Capsule by mouth every seven (7) days.    atorvastatin (LIPITOR) 20 mg tablet Take 1 Tablet by mouth daily.  amLODIPine (NORVASC) 10 mg tablet Take 1 Tablet by mouth daily. No current facility-administered medications for this visit. Review of Systems   Constitutional: Negative. Respiratory: Negative. Cardiovascular: Negative. Neurological: Negative. Vitals:    09/28/21 1538 09/28/21 1550   BP: (!) 151/104 (!) 148/98   Pulse: (!) 111 99   Resp: 12    Temp: 98.4 °F (36.9 °C)    TempSrc: Oral    SpO2: 99%    Weight: 310 lb 6.4 oz (140.8 kg)    Height: 5' 3\" (1.6 m)      Physical Exam  Constitutional:       Appearance: Normal appearance. She is well-developed and well-groomed. She is morbidly obese. Cardiovascular:      Rate and Rhythm: Normal rate and regular rhythm. Pulmonary:      Effort: Pulmonary effort is normal.   Skin:     General: Skin is warm and dry. Neurological:      Mental Status: She is oriented to person, place, and time. Psychiatric:         Mood and Affect: Mood normal.         Behavior: Behavior normal. Behavior is cooperative. Thought Content: Thought content normal.         Judgment: Judgment normal.       On this date 09/28/2021 I have spent 24 minutes reviewing previous notes, test results and face to face with the patient discussing the diagnosis and importance of compliance with the treatment plan as well as documenting on the day of the visit. An electronic signature was used to authenticate this note.   -- Andrew Pool NP

## 2021-09-28 NOTE — PATIENT INSTRUCTIONS
Losartan (By mouth)   Losartan ([id number]-[id number]-tan)  Treats high blood pressure. Reduces the risk of stroke in patients with high blood pressure and an enlarged heart. Treats kidney disease in patients with diabetes. This medicine is an angiotensin receptor blocker (ARB). Brand Name(s): Cozaar   There may be other brand names for this medicine. When This Medicine Should Not Be Used: This medicine is not right for everyone. Do not use it if you had an allergic reaction to losartan, or if you are pregnant. Do not use this medicine together with aliskiren if you have diabetes. How to Use This Medicine:   Tablet  · Take your medicine as directed. Your dose may need to be changed several times to find what works best for you. · Drink plenty of fluids if you exercise, sweat more than usual, or have diarrhea or vomiting. · Read and follow the patient instructions that come with this medicine. Talk to your doctor or pharmacist if you have any questions. · Missed dose: Take a dose as soon as you remember. If it is almost time for your next dose, wait until then and take a regular dose. Do not take extra medicine to make up for a missed dose. · Store the medicine in a closed container at room temperature, away from heat, moisture, and direct light. Drugs and Foods to Avoid:   Ask your doctor or pharmacist before using any other medicine, including over-the-counter medicines, vitamins, and herbal products. · Some medicines can affect how losartan works. Tell your doctor if you are using any of the following:   ¨ Lithium  ¨ Rifampin  ¨ A diuretic (water pill), such as spironolactone, triamterene, or amiloride  ¨ NSAID pain or arthritis medicine, such as aspirin, diclofenac, ibuprofen, or naproxen  ¨ Another blood pressure medicine, such as aliskiren, benazepril, enalapril, or lisinopril  · Ask your doctor before you use any medicine, supplement, or salt substitute that contains potassium.   Warnings While Using This Medicine:   · It is not safe to take this medicine during pregnancy. It could harm an unborn baby. Tell your doctor right away if you become pregnant. · Tell your doctor if you are breastfeeding, or if you have kidney disease, liver disease, congestive heart failure, or diabetes. Tell your doctor if you have had angioedema that was caused by a blood pressure medicine. · This medicine could lower your blood pressure too much, especially when you first use it or if you are dehydrated. Stand or sit up slowly if you feel lightheaded or dizzy. · Your doctor will do lab tests at regular visits to check on the effects of this medicine. Keep all appointments. · Keep all medicine out of the reach of children. Never share your medicine with anyone. Possible Side Effects While Using This Medicine:   Call your doctor right away if you notice any of these side effects:  · Allergic reaction: Itching or hives, swelling in your face or hands, swelling or tingling in your mouth or throat, chest tightness, trouble breathing  · Change in how much or how often you urinate  · Confusion, weakness, uneven heartbeat, trouble breathing, numbness or tingling in your hands, feet, or lips  · Lightheadedness, dizziness, fainting  · Rapid weight gain, swelling in your hands, ankles, or feet  If you notice these less serious side effects, talk with your doctor:   · Diarrhea  · Tiredness  If you notice other side effects that you think are caused by this medicine, tell your doctor. Call your doctor for medical advice about side effects. You may report side effects to FDA at 0-862-FDA-7507  © 2017 [facility] Information is for End User's use only and may not be sold, redistributed or otherwise used for commercial purposes. The above information is an  only. It is not intended as medical advice for individual conditions or treatments.  Talk to your doctor, nurse or pharmacist before following any medical regimen to see if it is safe and effective for you.

## 2021-10-28 ENCOUNTER — CLINICAL SUPPORT (OUTPATIENT)
Dept: FAMILY MEDICINE CLINIC | Age: 36
End: 2021-10-28

## 2021-10-28 VITALS — DIASTOLIC BLOOD PRESSURE: 74 MMHG | SYSTOLIC BLOOD PRESSURE: 119 MMHG | HEART RATE: 72 BPM

## 2021-10-28 DIAGNOSIS — Z01.30 BLOOD PRESSURE CHECK: Primary | ICD-10-CM

## 2021-10-28 NOTE — PROGRESS NOTES
Chaparro Matt (: 1985) is a 43 y.o. female, established patient, here for evaluation of the following chief complaint(s):  Blood Pressure Check     Vitals:    10/28/21 1434   BP: 119/74   Pulse: 72     Continue current meds    An electronic signature was used to authenticate this note.   -- Amparo Luu, NP

## 2021-11-22 ENCOUNTER — PATIENT MESSAGE (OUTPATIENT)
Dept: FAMILY MEDICINE CLINIC | Age: 36
End: 2021-11-22

## 2021-11-22 DIAGNOSIS — E55.9 VITAMIN D DEFICIENCY: ICD-10-CM

## 2021-11-22 DIAGNOSIS — I16.0 HYPERTENSIVE URGENCY: ICD-10-CM

## 2021-11-22 DIAGNOSIS — I10 ESSENTIAL HYPERTENSION: ICD-10-CM

## 2021-11-22 RX ORDER — AMLODIPINE BESYLATE 10 MG/1
10 TABLET ORAL DAILY
Qty: 90 TABLET | Refills: 1 | Status: SHIPPED | OUTPATIENT
Start: 2021-11-22 | End: 2021-11-29 | Stop reason: SDUPTHER

## 2021-11-22 NOTE — TELEPHONE ENCOUNTER
Last OV: 9/28/21   Next Appt: 1/4/22  Last Refill: 8/17/21     Requested Prescriptions     Pending Prescriptions Disp Refills    amLODIPine (NORVASC) 10 mg tablet 90 Tablet 1     Sig: Take 1 Tablet by mouth daily.

## 2021-11-22 NOTE — TELEPHONE ENCOUNTER
From: Lindsey Richter  To: Courtney Conway NP  Sent: 11/22/2021 11:15 AM EST  Subject: No more refills    Hello. I have no more refills on any of my blood pressure meds and Im out of two of them. Are you going to send another prescription or do I need to come in.

## 2021-11-23 RX ORDER — ATORVASTATIN CALCIUM 20 MG/1
20 TABLET, FILM COATED ORAL DAILY
Qty: 90 TABLET | Refills: 3 | Status: SHIPPED | OUTPATIENT
Start: 2021-11-23 | End: 2021-11-29 | Stop reason: SDUPTHER

## 2021-11-23 RX ORDER — HYDROCHLOROTHIAZIDE 25 MG/1
25 TABLET ORAL DAILY
Qty: 90 TABLET | Refills: 3 | Status: SHIPPED | OUTPATIENT
Start: 2021-11-23 | End: 2021-11-29 | Stop reason: SDUPTHER

## 2021-11-29 RX ORDER — ERGOCALCIFEROL 1.25 MG/1
50000 CAPSULE ORAL
Qty: 12 CAPSULE | Refills: 3 | Status: SHIPPED | OUTPATIENT
Start: 2021-11-29 | End: 2022-09-08

## 2021-11-29 RX ORDER — ATORVASTATIN CALCIUM 20 MG/1
20 TABLET, FILM COATED ORAL DAILY
Qty: 90 TABLET | Refills: 3 | Status: SHIPPED | OUTPATIENT
Start: 2021-11-29

## 2021-11-29 RX ORDER — HYDROCHLOROTHIAZIDE 25 MG/1
25 TABLET ORAL DAILY
Qty: 90 TABLET | Refills: 3 | Status: SHIPPED | OUTPATIENT
Start: 2021-11-29 | End: 2022-09-08

## 2021-11-29 RX ORDER — AMLODIPINE BESYLATE 10 MG/1
10 TABLET ORAL DAILY
Qty: 90 TABLET | Refills: 3 | Status: SHIPPED | OUTPATIENT
Start: 2021-11-29 | End: 2022-01-17

## 2021-11-29 RX ORDER — LOSARTAN POTASSIUM 50 MG/1
50 TABLET ORAL DAILY
Qty: 90 TABLET | Refills: 3 | Status: SHIPPED | OUTPATIENT
Start: 2021-11-29

## 2022-01-14 DIAGNOSIS — I16.0 HYPERTENSIVE URGENCY: ICD-10-CM

## 2022-01-17 RX ORDER — AMLODIPINE BESYLATE 10 MG/1
TABLET ORAL
Qty: 90 TABLET | Refills: 3 | Status: SHIPPED | OUTPATIENT
Start: 2022-01-17 | End: 2022-02-21 | Stop reason: SDUPTHER

## 2022-02-21 DIAGNOSIS — I16.0 HYPERTENSIVE URGENCY: ICD-10-CM

## 2022-02-21 RX ORDER — AMLODIPINE BESYLATE 10 MG/1
10 TABLET ORAL DAILY
Qty: 90 TABLET | Refills: 3 | Status: SHIPPED | OUTPATIENT
Start: 2022-02-21 | End: 2022-09-08

## 2022-02-21 NOTE — TELEPHONE ENCOUNTER
[facility] mailorder sent a 90 day fax request for patient's amlodipine 10mg. Last visit:9/28/21  Next visit:3/08/22  Previous refill 1/17/22(90+3R)    Requested Prescriptions     Pending Prescriptions Disp Refills    amLODIPine (NORVASC) 10 mg tablet 90 Tablet 3     Sig: Take 1 Tablet by mouth daily.

## 2022-03-08 ENCOUNTER — OFFICE VISIT (OUTPATIENT)
Dept: FAMILY MEDICINE CLINIC | Age: 37
End: 2022-03-08
Payer: COMMERCIAL

## 2022-03-08 VITALS
HEIGHT: 63 IN | OXYGEN SATURATION: 99 % | HEART RATE: 70 BPM | RESPIRATION RATE: 14 BRPM | BODY MASS INDEX: 51.91 KG/M2 | WEIGHT: 293 LBS | SYSTOLIC BLOOD PRESSURE: 146 MMHG | DIASTOLIC BLOOD PRESSURE: 94 MMHG | TEMPERATURE: 98.9 F

## 2022-03-08 DIAGNOSIS — R73.03 PREDIABETES: ICD-10-CM

## 2022-03-08 DIAGNOSIS — I10 ESSENTIAL HYPERTENSION: Primary | ICD-10-CM

## 2022-03-08 DIAGNOSIS — E66.01 MORBID OBESITY WITH BMI OF 50.0-59.9, ADULT (HCC): ICD-10-CM

## 2022-03-08 DIAGNOSIS — E78.2 MIXED HYPERLIPIDEMIA: ICD-10-CM

## 2022-03-08 PROCEDURE — 99214 OFFICE O/P EST MOD 30 MIN: CPT | Performed by: NURSE PRACTITIONER

## 2022-03-08 NOTE — PROGRESS NOTES
Chief Complaint   Patient presents with    Hypertension     Pt states hasn't had amlodipine in 2 weeks. 1. \"Have you been to the ER, urgent care clinic since your last visit? Hospitalized since your last visit? \" No    2. \"Have you seen or consulted any other health care providers outside of the [address] [place] [address] since your last visit? \" No     3. For patients aged 39-70: Has the patient had a colonoscopy / FIT/ Cologuard? NA - based on age      If the patient is female:    4. For patients aged 41-77: Has the patient had a mammogram within the past 2 years? NA - based on age or sex      11. For patients aged 21-65: Has the patient had a pap smear? NA - based on age or sex    COVID Booster- Pt declined at this time.      Health Maintenance Due   Topic Date Due    COVID-19 Vaccine (2 - Booster for [employer] series) 05/27/2021

## 2022-03-08 NOTE — LETTER
3/8/2022 10:12 AM    Ms. Peggy Clay  [address]  [address] [address]    Peggy Clay has been a patient of mine since March 2016. The patient is 5'3\" and weighs 314 lbs. with a BMI of 55.76. The patient has been obese since I have been her provider, and her highest weight was 315 lbs. She has attempted and been unsuccessful at many different methods of weight loss by diet and exercise, including weight watchers, Atkins diet, keto diet, and would certainly benefit from bariatric surgery. In addition to morbid obesity, the patient is suffering from the following comorbid conditions: prediabetes, hypertension, and hyperlipidemia. I feel that the patient would benefit greatly from bariatric surgery as a tool to help lose the excess weight, lessen the comorbidities, and regain a more healthful life. I am writing this letter in full support of weight reduction surgery and feel that this patient is suitable for this procedure and this patient is cleared to proceed with bariatric surgery.     Sincerely,          Leslee Villatoro NP

## 2022-03-08 NOTE — PROGRESS NOTES
Kadie Hagan (: 1985) is a 43 y.o. female, established patient, here for evaluation of the following chief complaint(s):  Hypertension       ASSESSMENT/PLAN:  Below is the assessment and plan developed based on review of pertinent history, physical exam, labs, studies, and medications. 1. Essential hypertension - not at goal but patient has been out of amlodipine for 2 weeks. States the pharmacy has shipped it yesterday. Will return in couple weeks for BP check only  -     METABOLIC PANEL, COMPREHENSIVE; Future  -     LIPID PANEL; Future  2. Mixed hyperlipidemia - tolerating statin. Check FLP.  -     METABOLIC PANEL, COMPREHENSIVE; Future  -     LIPID PANEL; Future  3. Prediabetes  -     METABOLIC PANEL, COMPREHENSIVE; Future  -     HEMOGLOBIN A1C WITH EAG; Future  4. Morbid obesity with BMI of 50.0-59.9, adult ([facility] [address] - she is seeking weight loss surgery. Will send letter of support to Dr. Bello Dobson      Return in about 6 months (around 2022). SUBJECTIVE/OBJECTIVE:  HPI  Patient comes in today for follow up HTN    She took losartan and HCTZ around 630-7a this morning. She has been out of amlodipine for 2 weeks. States they just shipped yesterday. Not checking BP at home. Saw Dr. Bello Dobson - bariatric surgery, patient interested in weight loss surgery. States she has to see GI for upper endoscopy and needs to see sleep specialist to be fitted for CPAP as she was dx with CARINA. Patient was seen on 21 for headaches, hypertensive urgency. She had not been on BP medication for 1 year. Started on amlodipine 10mg. She returned to office in 1 week, BP was still elevated. Added HCTZ 25mg. At visit in 2021 - was still elevated.   Added losartan.  had nurse visit for BP check in Oct 2021 - BP  Normal.   Denies headaches, palpitations, dizziness, chest pains  No Known Allergies    Past Medical History:   Diagnosis Date    Anemia     Breast lump     left breast lump, doctor felt lump 2018    Hypertension 2021    Mixed incontinence urge and stress     Vitamin D deficiency 2018       Past Surgical History:   Procedure Laterality Date    HX PARTIAL HYSTERECTOMY      left ovaries     HX TUBAL LIGATION             Social History     Socioeconomic History    Marital status: SINGLE     Spouse name: Not on file    Number of children: Not on file    Years of education: Not on file    Highest education level: Not on file   Occupational History    Not on file   Tobacco Use    Smoking status: Former Smoker     Packs/day: 1.00     Years: 11.00     Pack years: 11.00     Types: Cigarettes     Quit date: 2022     Years since quittin.0    Smokeless tobacco: Never Used   Vaping Use    Vaping Use: Never used   Substance and Sexual Activity    Alcohol use: Yes     Alcohol/week: 1.0 standard drink     Types: 1 Glasses of wine per week     Comment: socially    Drug use: Yes     Types: Marijuana     Comment: socially    Sexual activity: Yes     Partners: Male     Birth control/protection: Surgical   Other Topics Concern    Not on file   Social History Narrative    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 Social Determinants of Health     Financial Resource Strain:     Difficulty of Paying Living Expenses: Not on file   Food Insecurity:     Worried About Running Out of Food in the Last Year: Not on file    [place] of Food in the Last Year: Not on file   Transportation Needs:     Lack of Transportation (Medical): Not on file    Lack of Transportation (Non-Medical):  Not on file   Physical Activity:     Days of Exercise per Week: Not on file    Minutes of Exercise per Session: Not on file   Stress:     Feeling of Stress : Not on file   Social Connections:     Frequency of Communication with Friends and Family: Not on file    Frequency of Social Gatherings with Friends and Family: Not on file    Attends Hoahaoism Services: Not on file   [facility] Active Member of Clubs or Organizations: Not on file    Attends Club or Organization Meetings: Not on file    Marital Status: Not on file   Intimate Partner Violence:     Fear of Current or Ex-Partner: Not on file    Emotionally Abused: Not on file    Physically Abused: Not on file    Sexually Abused: Not on file   Housing Stability:     Unable to Pay for Housing in the Last Year: Not on file    Number of [place] in the Last Year: Not on file    Unstable Housing in the Last Year: Not on file       Family History   Problem Relation Age of Onset    No Known Problems Mother     Hypertension Father     Diabetes Sister     Hypertension Sister     No Known Problems Brother     Diabetes Sister     Cancer Maternal Grandfather         thyroid    Stroke Maternal Grandmother        Current Outpatient Medications   Medication Sig    atorvastatin (LIPITOR) 20 mg tablet Take 1 Tablet by mouth daily.  ergocalciferol (ERGOCALCIFEROL) 1,250 mcg (50,000 unit) capsule Take 1 Capsule by mouth every seven (7) days.  hydroCHLOROthiazide (HYDRODIURIL) 25 mg tablet Take 1 Tablet by mouth daily.  losartan (COZAAR) 50 mg tablet Take 1 Tablet by mouth daily.  amLODIPine (NORVASC) 10 mg tablet Take 1 Tablet by mouth daily. (Patient not taking: Reported on 3/8/2022)     No current facility-administered medications for this visit. Review of Systems   Constitutional: Negative. Respiratory: Negative. Cardiovascular: Negative. Gastrointestinal: Negative. Genitourinary: Negative. Neurological: Negative. Vitals:    03/08/22 0803 03/08/22 0834   BP: (!) 172/91 (!) 146/94   Pulse: 70    Resp: 14    Temp: 98.9 °F (37.2 °C)    TempSrc: Oral    SpO2: 99%    Weight: 314 lb 12.8 oz (142.8 kg)    Height: 5' 3\" (1.6 m)      Physical Exam  Constitutional:       Appearance: Normal appearance. She is well-developed and well-groomed. She is morbidly obese.    Cardiovascular:      Rate and Rhythm: Normal rate and regular rhythm. Heart sounds: Normal heart sounds. Pulmonary:      Effort: Pulmonary effort is normal.      Breath sounds: Normal breath sounds. Musculoskeletal:      Right lower leg: No edema. Left lower leg: No edema. Skin:     General: Skin is warm and dry. Neurological:      Mental Status: She is oriented to person, place, and time. Psychiatric:         Mood and Affect: Mood normal.         Behavior: Behavior normal. Behavior is cooperative. Thought Content: Thought content normal.         Judgment: Judgment normal.       On this date 03/08/2022 I have spent 30 minutes reviewing previous notes, test results and face to face with the patient discussing the diagnosis and importance of compliance with the treatment plan as well as documenting on the day of the visit. An electronic signature was used to authenticate this note.   -- Reina Carvajal NP

## 2022-03-09 LAB
ALBUMIN SERPL-MCNC: 3.4 G/DL (ref 3.5–5)
ALBUMIN/GLOB SERPL: 1 {RATIO} (ref 1.1–2.2)
ALP SERPL-CCNC: 59 U/L (ref 45–117)
ALT SERPL-CCNC: 22 U/L (ref 12–78)
ANION GAP SERPL CALC-SCNC: 4 MMOL/L (ref 5–15)
AST SERPL-CCNC: 14 U/L (ref 15–37)
BILIRUB SERPL-MCNC: 0.3 MG/DL (ref 0.2–1)
BUN SERPL-MCNC: 14 MG/DL (ref 6–20)
BUN/CREAT SERPL: 13 (ref 12–20)
CALCIUM SERPL-MCNC: 9.1 MG/DL (ref 8.5–10.1)
CHLORIDE SERPL-SCNC: 106 MMOL/L (ref 97–108)
CHOLEST SERPL-MCNC: 140 MG/DL
CO2 SERPL-SCNC: 28 MMOL/L (ref 21–32)
CREAT SERPL-MCNC: 1.08 MG/DL (ref 0.55–1.02)
EST. AVERAGE GLUCOSE BLD GHB EST-MCNC: 154 MG/DL
GLOBULIN SER CALC-MCNC: 3.5 G/DL (ref 2–4)
GLUCOSE SERPL-MCNC: 154 MG/DL (ref 65–100)
HBA1C MFR BLD: 7 % (ref 4–5.6)
HDLC SERPL-MCNC: 36 MG/DL
HDLC SERPL: 3.9 {RATIO} (ref 0–5)
LDLC SERPL CALC-MCNC: 83 MG/DL (ref 0–100)
POTASSIUM SERPL-SCNC: 4 MMOL/L (ref 3.5–5.1)
PROT SERPL-MCNC: 6.9 G/DL (ref 6.4–8.2)
SODIUM SERPL-SCNC: 138 MMOL/L (ref 136–145)
TRIGL SERPL-MCNC: 105 MG/DL (ref ?–150)
VLDLC SERPL CALC-MCNC: 21 MG/DL

## 2022-03-11 NOTE — PROGRESS NOTES
A1c 7% - this is now considered diabetes. Would like for you to start taking metformin XR 500mg once daily with dinner to help get this back down until you can get surgery and start losing some more weight. Follow a low carbohydrate diet, cutting back on breads, rice, pastas, sweets. Avoid all sweetened drinks (regular sodas, juices, sweet teas). Are you interested in seeing [facility] for diabetes education. Cholesterol looks great. Liver and kidney function normal/stable.

## 2022-03-11 NOTE — TELEPHONE ENCOUNTER
----- Message from Max Koyanagi, NP sent at 3/10/2022  9:29 PM EST -----  A1c 7% - this is now considered diabetes. Would like for you to start taking metformin XR 500mg once daily with dinner to help get this back down until you can get surgery and start losing some more weight. Follow a low carbohydrate diet, cutting back on breads, rice, pastas, sweets. Avoid all sweetened drinks (regular sodas, juices, sweet teas). Are you interested in seeing [facility] for diabetes education. Cholesterol looks great. Liver and kidney function normal/stable.

## 2022-03-14 ENCOUNTER — HOSPITAL ENCOUNTER (OUTPATIENT)
Dept: PREADMISSION TESTING | Age: 37
Discharge: HOME | End: 2022-03-14
Attending: INTERNAL MEDICINE
Payer: COMMERCIAL

## 2022-03-14 ENCOUNTER — TRANSCRIBE ORDER (OUTPATIENT)
Dept: REGISTRATION | Age: 37
End: 2022-03-14

## 2022-03-14 DIAGNOSIS — U07.1 COVID-19: Primary | ICD-10-CM

## 2022-03-14 DIAGNOSIS — U07.1 COVID-19: ICD-10-CM

## 2022-03-14 PROCEDURE — U0005 INFEC AGEN DETEC AMPLI PROBE: HCPCS

## 2022-03-15 LAB
SARS-COV-2, XPLCVT: NOT DETECTED
SOURCE, COVRS: NORMAL

## 2022-03-15 RX ORDER — METFORMIN HYDROCHLORIDE 500 MG/1
500 TABLET, EXTENDED RELEASE ORAL
Qty: 90 TABLET | Refills: 3 | Status: SHIPPED | OUTPATIENT
Start: 2022-03-15 | End: 2022-09-08

## 2022-03-17 ENCOUNTER — ANESTHESIA EVENT (OUTPATIENT)
Dept: ENDOSCOPY | Age: 37
End: 2022-03-17
Payer: COMMERCIAL

## 2022-03-17 ENCOUNTER — HOSPITAL ENCOUNTER (OUTPATIENT)
Age: 37
Setting detail: OUTPATIENT SURGERY
Discharge: HOME | End: 2022-03-17
Attending: INTERNAL MEDICINE | Admitting: INTERNAL MEDICINE
Payer: COMMERCIAL

## 2022-03-17 ENCOUNTER — ANESTHESIA (OUTPATIENT)
Dept: ENDOSCOPY | Age: 37
End: 2022-03-17
Payer: COMMERCIAL

## 2022-03-17 VITALS
WEIGHT: 293 LBS | HEART RATE: 89 BPM | OXYGEN SATURATION: 94 % | BODY MASS INDEX: 53.92 KG/M2 | DIASTOLIC BLOOD PRESSURE: 93 MMHG | HEIGHT: 62 IN | RESPIRATION RATE: 16 BRPM | TEMPERATURE: 99.1 F | SYSTOLIC BLOOD PRESSURE: 139 MMHG

## 2022-03-17 PROCEDURE — 76060000031 HC ANESTHESIA FIRST 0.5 HR: Performed by: INTERNAL MEDICINE

## 2022-03-17 PROCEDURE — 88305 TISSUE EXAM BY PATHOLOGIST: CPT

## 2022-03-17 PROCEDURE — 74011000250 HC RX REV CODE- 250: Performed by: ANESTHESIOLOGY

## 2022-03-17 PROCEDURE — 74011250636 HC RX REV CODE- 250/636: Performed by: ANESTHESIOLOGY

## 2022-03-17 PROCEDURE — 2709999900 HC NON-CHARGEABLE SUPPLY: Performed by: INTERNAL MEDICINE

## 2022-03-17 PROCEDURE — 87077 CULTURE AEROBIC IDENTIFY: CPT | Performed by: INTERNAL MEDICINE

## 2022-03-17 PROCEDURE — 77030039825 HC MSK NSL PAP SUPERNO2VA VYRM -B: Performed by: ANESTHESIOLOGY

## 2022-03-17 PROCEDURE — 76040000019: Performed by: INTERNAL MEDICINE

## 2022-03-17 PROCEDURE — 77030021593 HC FCPS BIOP ENDOSC BSC -A: Performed by: INTERNAL MEDICINE

## 2022-03-17 RX ORDER — EPINEPHRINE 0.1 MG/ML
1 INJECTION INTRACARDIAC; INTRAVENOUS
Status: DISCONTINUED | OUTPATIENT
Start: 2022-03-17 | End: 2022-03-17 | Stop reason: HOSPADM

## 2022-03-17 RX ORDER — PROPOFOL 10 MG/ML
INJECTION, EMULSION INTRAVENOUS AS NEEDED
Status: DISCONTINUED | OUTPATIENT
Start: 2022-03-17 | End: 2022-03-17 | Stop reason: HOSPADM

## 2022-03-17 RX ORDER — DEXTROMETHORPHAN/PSEUDOEPHED 2.5-7.5/.8
1.2 DROPS ORAL
Status: DISCONTINUED | OUTPATIENT
Start: 2022-03-17 | End: 2022-03-17 | Stop reason: HOSPADM

## 2022-03-17 RX ORDER — SODIUM CHLORIDE 9 MG/ML
150 INJECTION, SOLUTION INTRAVENOUS CONTINUOUS
Status: DISCONTINUED | OUTPATIENT
Start: 2022-03-17 | End: 2022-03-17 | Stop reason: HOSPADM

## 2022-03-17 RX ORDER — ATROPINE SULFATE 0.1 MG/ML
0.5 INJECTION INTRAVENOUS
Status: DISCONTINUED | OUTPATIENT
Start: 2022-03-17 | End: 2022-03-17 | Stop reason: HOSPADM

## 2022-03-17 RX ORDER — BISMUTH SUBSALICYLATE 262 MG
1 TABLET,CHEWABLE ORAL DAILY
COMMUNITY

## 2022-03-17 RX ORDER — SODIUM CHLORIDE 0.9 % (FLUSH) 0.9 %
5-40 SYRINGE (ML) INJECTION AS NEEDED
Status: DISCONTINUED | OUTPATIENT
Start: 2022-03-17 | End: 2022-03-17 | Stop reason: HOSPADM

## 2022-03-17 RX ORDER — SODIUM CHLORIDE 0.9 % (FLUSH) 0.9 %
5-40 SYRINGE (ML) INJECTION EVERY 8 HOURS
Status: DISCONTINUED | OUTPATIENT
Start: 2022-03-17 | End: 2022-03-17 | Stop reason: HOSPADM

## 2022-03-17 RX ORDER — FENTANYL CITRATE 50 UG/ML
200 INJECTION, SOLUTION INTRAMUSCULAR; INTRAVENOUS
Status: DISCONTINUED | OUTPATIENT
Start: 2022-03-17 | End: 2022-03-17 | Stop reason: HOSPADM

## 2022-03-17 RX ORDER — MIDAZOLAM HYDROCHLORIDE 1 MG/ML
.25-5 INJECTION, SOLUTION INTRAMUSCULAR; INTRAVENOUS
Status: DISCONTINUED | OUTPATIENT
Start: 2022-03-17 | End: 2022-03-17 | Stop reason: HOSPADM

## 2022-03-17 RX ORDER — SODIUM CHLORIDE 9 MG/ML
INJECTION, SOLUTION INTRAVENOUS
Status: DISCONTINUED | OUTPATIENT
Start: 2022-03-17 | End: 2022-03-17 | Stop reason: HOSPADM

## 2022-03-17 RX ORDER — LIDOCAINE HYDROCHLORIDE 20 MG/ML
INJECTION, SOLUTION EPIDURAL; INFILTRATION; INTRACAUDAL; PERINEURAL AS NEEDED
Status: DISCONTINUED | OUTPATIENT
Start: 2022-03-17 | End: 2022-03-17 | Stop reason: HOSPADM

## 2022-03-17 RX ADMIN — PROPOFOL 20 MG: 10 INJECTION, EMULSION INTRAVENOUS at 13:22

## 2022-03-17 RX ADMIN — PROPOFOL 40 MG: 10 INJECTION, EMULSION INTRAVENOUS at 13:19

## 2022-03-17 RX ADMIN — PROPOFOL 20 MG: 10 INJECTION, EMULSION INTRAVENOUS at 13:14

## 2022-03-17 RX ADMIN — PROPOFOL 40 MG: 10 INJECTION, EMULSION INTRAVENOUS at 13:09

## 2022-03-17 RX ADMIN — LIDOCAINE HYDROCHLORIDE 100 MG: 20 INJECTION, SOLUTION EPIDURAL; INFILTRATION; INTRACAUDAL; PERINEURAL at 13:04

## 2022-03-17 RX ADMIN — PROPOFOL 60 MG: 10 INJECTION, EMULSION INTRAVENOUS at 13:06

## 2022-03-17 RX ADMIN — PROPOFOL 80 MG: 10 INJECTION, EMULSION INTRAVENOUS at 13:04

## 2022-03-17 RX ADMIN — SODIUM CHLORIDE: 900 INJECTION, SOLUTION INTRAVENOUS at 13:04

## 2022-03-17 NOTE — H&P
[address], [address] [place] [address]          Pre-procedure History and Physical       NAME:  Kamala Nick   :   1985   MRN:   [id number]     CHIEF COMPLAINT/HPI: morbid obesity, eval prior to bariatric surgery    PMH:  Past Medical History:   Diagnosis Date    Anemia     Breast lump     left breast lump, doctor felt lump 2018    Hypertension 2021    Mixed incontinence urge and stress     Vitamin D deficiency 2018       PSH:  Past Surgical History:   Procedure Laterality Date    HX PARTIAL HYSTERECTOMY      left ovaries     HX TUBAL LIGATION             Allergies:  No Known Allergies    Home Medications:  Prior to Admission Medications   Prescriptions Last Dose Informant Patient Reported? Taking? amLODIPine (NORVASC) 10 mg tablet 3/17/2022 at Unknown time  No Yes   Sig: Take 1 Tablet by mouth daily. atorvastatin (LIPITOR) 20 mg tablet 3/17/2022 at Unknown time  No Yes   Sig: Take 1 Tablet by mouth daily. ergocalciferol (ERGOCALCIFEROL) 1,250 mcg (50,000 unit) capsule 3/14/2022  No No   Sig: Take 1 Capsule by mouth every seven (7) days. hydroCHLOROthiazide (HYDRODIURIL) 25 mg tablet 3/17/2022 at Unknown time  No Yes   Sig: Take 1 Tablet by mouth daily. losartan (COZAAR) 50 mg tablet 3/17/2022 at Unknown time  No Yes   Sig: Take 1 Tablet by mouth daily. metFORMIN ER (GLUCOPHAGE XR) 500 mg tablet Unknown at Unknown time  No No   Sig: Take 1 Tablet by mouth daily (with dinner). multivitamin (ONE A DAY) tablet   Yes Yes   Sig: Take 1 Tablet by mouth daily.       Facility-Administered Medications: None       Hospital Medications:  Current Facility-Administered Medications   Medication Dose Route Frequency    0.9% sodium chloride infusion  150 mL/hr IntraVENous CONTINUOUS    sodium chloride (NS) flush 5-40 mL  5-40 mL IntraVENous Q8H    sodium chloride (NS) flush 5-40 mL  5-40 mL IntraVENous PRN    midazolam (VERSED) injection 0.25-5 mg 0.25-5 mg IntraVENous Multiple    fentaNYL citrate (PF) injection 200 mcg  200 mcg IntraVENous Multiple    simethicone (MYLICON) 84RQ/5.9ZH oral drops 80 mg  1.2 mL Oral Multiple    atropine injection 0.5 mg  0.5 mg IntraVENous ONCE PRN    EPINEPHrine (ADRENALIN) 0.1 mg/mL syringe 1 mg  1 mg Endoscopically ONCE PRN       Family History:  Family History   Problem Relation Age of Onset    No Known Problems Mother     Hypertension Father     Diabetes Sister     Hypertension Sister     No Known Problems Brother     Diabetes Sister     Cancer Maternal Grandfather         thyroid    Stroke Maternal Grandmother        Social History:  Social History     Tobacco Use    Smoking status: Former Smoker     Packs/day: 1.00     Years: 11.00     Pack years: 11.00     Types: Cigarettes     Quit date: 2022     Years since quittin.0    Smokeless tobacco: Never Used   Substance Use Topics    Alcohol use: Yes     Alcohol/week: 1.0 standard drink     Types: 1 Glasses of wine per week     Comment: socially       The patient was counseled at length about the risks of cholo Covid-19 in the pako-operative and post-operative states including the recovery window of their procedure. The patient was made aware that cholo Covid-19 after a surgical procedure may worsen their prognosis for recovering from the virus and lend to a higher morbidity and or mortality risk. The patient was given the options of postponing their procedure. All of the risks, benefits, and alternatives were discussed. The patient does  wish to proceed with the procedure. PHYSICAL EXAM PRIOR TO SEDATION:  General: Alert, in no acute distress    Lungs:            CTA bilaterally  Heart:  Normal S1, S2    Abdomen: Soft, Non distended, Non tender. Normoactive bowel sounds. Assessment:   Stable for sedation administration.     Plan:     · Endoscopic procedure with sedation     Signed By: Missael Zuniga MD     3/17/2022  1:01 PM

## 2022-03-17 NOTE — ANESTHESIA POSTPROCEDURE EVALUATION
Procedure(s):  ESOPHAGOGASTRODUODENOSCOPY (EGD)   :-  ESOPHAGOGASTRODUODENAL (EGD) BIOPSY. MAC    Anesthesia Post Evaluation        Patient location during evaluation: PACU  Patient participation: complete - patient participated  Level of consciousness: awake and alert  Pain management: adequate  Airway patency: patent  Anesthetic complications: no  Cardiovascular status: acceptable  Respiratory status: acceptable  Hydration status: acceptable  Comments: I have seen and evaluated the patient and is ready for discharge. Scott Lerma MD    Post anesthesia nausea and vomiting:  none      INITIAL Post-op Vital signs: No vitals data found for the desired time range.

## 2022-03-17 NOTE — PROGRESS NOTES
Sujata Maxco  1985  [id number]    Situation:  Verbal report received from: Juan Ann, procedural RN  Procedure: Procedure(s):  ESOPHAGOGASTRODUODENOSCOPY (EGD)   :-  ESOPHAGOGASTRODUODENAL (EGD) BIOPSY    Background:    Preoperative diagnosis: PRE-OP EVALUATION, BODY MASS INDEX ABOVE 50, UNSPECIFIED PROTEIN CALORIE MALNUTRITION, ANEMIA, MORBID OBESITY  Postoperative diagnosis: duodendal polyp    :  Dr. Pasha Newsome  Assistant(s): Endoscopy Technician-1: Yaneth Al  Endoscopy RN-1: Shira Mena RN    Specimens:   ID Type Source Tests Collected by Time Destination   1 : duodenal polyp biopsy Preservative Duodenum  Barbi Saini MD 3/17/2022 1313 Pathology     H. Pylori  no      Anesthesia gave intra-procedure sedation and medications, see anesthesia flow sheet yes    Intravenous fluids: NS@ KVO     Vital signs stable     Abdominal assessment: round and soft     Recommendation:  Discharge patient per MD order. Family : son in waiting room  Permission to share finding with family or friend yes    26 Cleared pt's swallowing with Dr. Nadya Freire; pt handling saliva well; pt cleared to take liquids and food. Pt drank apple juice without problems and no coughing observed.

## 2022-03-17 NOTE — PERIOP NOTES

## 2022-03-17 NOTE — ANESTHESIA PREPROCEDURE EVALUATION
Relevant Problems   CARDIOVASCULAR   (+) Hypertension      ENDOCRINE   (+) Obesity, morbid (HCC)      HEMATOLOGY   (+) Anemia       Anesthetic History   No history of anesthetic complications            Review of Systems / Medical History  Patient summary reviewed, nursing notes reviewed and pertinent labs reviewed    Pulmonary  Within defined limits                 Neuro/Psych   Within defined limits           Cardiovascular  Within defined limits  Hypertension                   GI/Hepatic/Renal  Within defined limits              Endo/Other  Within defined limits      Morbid obesity     Other Findings              Physical Exam    Airway  Mallampati: II  TM Distance: > 6 cm  Neck ROM: normal range of motion   Mouth opening: Normal     Cardiovascular  Regular rate and rhythm,  S1 and S2 normal,  no murmur, click, rub, or gallop             Dental  No notable dental hx       Pulmonary  Breath sounds clear to auscultation               Abdominal  GI exam deferred       Other Findings            Anesthetic Plan    ASA: 3  Anesthesia type: MAC            Anesthetic plan and risks discussed with: Patient

## 2022-03-17 NOTE — DISCHARGE INSTRUCTIONS
Nereida [facility] [address] Bety Malik M.D.  [address] [facility], [address] [place] [address]  [phone]         EGD DISCHARGE INSTRUCTIONS      Carla Perkins  [id number]  1985    DISCOMFORT:  Sore throat- throat lozenges or warm salt water gargle  Redness at IV site- apply warm compress to area; if redness or soreness persist- contact your physician  Gaseous discomfort- walking, belching will help relieve any discomfort  You may not operate a vehicle for 12 hours  You may not engage in an occupation involving machinery or appliances for the  rest of today  You may not drink alcoholic beverages for at least 12 hours  Avoid making any critical decisions for at least 24 hours    DIET:   You may resume your normal diet, but some patients find that heavy or large  meals may lead to indigestion or vomiting. I suggest a light meal as first food  Intake. I recommend a whole food, plant-based diet for your overall health. ACTIVITY:  You may resume your normal daily activities. It is recommended that you spend the remainder of the day resting - avoid any strenuous activity. CALL M.D. IF ANY SIGN OF:   Increasing pain, nausea, vomiting  Abdominal distension (swelling)  Significant bleeding (oral or rectal)  Fever   Pain in chest area  Shortness of breath    Additional Instructions:   Call Dr. Bety Malik if any questions or problems at [phone]  You should receive the biopsy results by phone or mail within 3 weeks, if not, call my office for the results  EGD showed benign appearing duodenal polyp s/p biopsies, rest normal.      Learning About Coronavirus (COVID-19)  Coronavirus (COVID-19): Overview  What is coronavirus (COVID-19)? The coronavirus disease (COVID-19) is caused by a virus. It is an illness that was first found in [place], [place], in December 2019. It has since spread worldwide. The virus can cause fever, cough, and trouble breathing.  In severe cases, it can cause pneumonia and make it hard to breathe without help. It can cause death. Coronaviruses are a large group of viruses. They cause the common cold. They also cause more serious illnesses like Middle East respiratory syndrome (MERS) and severe acute respiratory syndrome (SARS). COVID-19 is caused by a novel coronavirus. That means it's a new type that has not been seen in people before. This virus spreads person-to-person through droplets from coughing and sneezing. It can also spread when you are close to someone who is infected. And it can spread when you touch something that has the virus on it, such as a doorknob or a tabletop. What can you do to protect yourself from coronavirus (COVID-19)? The best way to protect yourself from getting sick is to:  · Avoid areas where there is an outbreak. · Avoid contact with people who may be infected. · Wash your hands often with soap or alcohol-based hand sanitizers. · Avoid crowds and try to stay at least 6 feet away from other people. · Wash your hands often, especially after you cough or sneeze. Use soap and water, and scrub for at least 20 seconds. If soap and water aren't available, use an alcohol-based hand . · Avoid touching your mouth, nose, and eyes. What can you do to avoid spreading the virus to others? To help avoid spreading the virus to others:  · Cover your mouth with a tissue when you cough or sneeze. Then throw the tissue in the trash. · Use a disinfectant to clean things that you touch often. · Stay home if you are sick or have been exposed to the virus. Don't go to school, work, or public areas. And don't use public transportation. · If you are sick:  ? Leave your home only if you need to get medical care. But call the doctor's office first so they know you're coming. And wear a face mask, if you have one.  ? If you have a face mask, wear it whenever you're around other people. It can help stop the spread of the virus when you cough or sneeze. ?  Clean and disinfect your home every day. Use household  and disinfectant wipes or sprays. Take special care to clean things that you grab with your hands. These include doorknobs, remote controls, phones, and handles on your refrigerator and microwave. And don't forget countertops, tabletops, bathrooms, and computer keyboards. When to call for help  Call 911 anytime you think you may need emergency care. For example, call if:  · You have severe trouble breathing. (You can't talk at all.)  · You have constant chest pain or pressure. · You are severely dizzy or lightheaded. · You are confused or can't think clearly. · Your face and lips have a blue color. · You pass out (lose consciousness) or are very hard to wake up. Call your doctor now if you develop symptoms such as:  · Shortness of breath. · Fever. · Cough. If you need to get care, call ahead to the doctor's office for instructions before you go. Make sure you wear a face mask, if you have one, to prevent exposing other people to the virus. Where can you get the latest information? The following health organizations are tracking and studying this virus. Their websites contain the most up-to-date information. Brianna Cordero also learn what to do if you think you may have been exposed to the virus. · U.S. Centers for Disease Control and Prevention (CDC): The CDC provides updated news about the disease and travel advice. The website also tells you how to prevent the spread of infection. www.cdc.gov  · World Health Organization [facility]): WHO offers information about the virus outbreaks. WHO also has travel advice. www.who.int  Current as of: April 1, 2020               Content Version: 12.4  © 3993-5049 [employer]wise, [employer]orporated.    Care instructions adapted under license by your healthcare professional. If you have questions about a medical condition or this instruction, always ask your healthcare professional. Wilfred Muhammad disclaims any warranty or liability for your use of this information.

## 2022-03-17 NOTE — PROCEDURES
[facility] [address] Honey Malik M.D.  [address], [address]  [phone]               Esophagogastroduodenoscopy (EGD) Procedure Note    NAME: Tony Veliz  :  1985  MRN:  [id number]    Indications:  morbid obesity     : Mary Gomez MD    Referring Provider:  Melody Nicolas NP; Kimberley Hicks MD    Staff: Endoscopy Technician-1: Brandon Han  Endoscopy RN-1: Zahra cSott RN    Prosthetic devices, grafts, tissues, transplant, or devices implanted: none    Medicine:  MAC anesthesia      Procedure Details:  After informed consent was obtained with all risks and benefits of the procedure explained and preprocedure exam completed, the patient was placed in the left lateral decubitus position. Universal protocol for patient identification was performed and documented in the nursing notes. Throughout the procedure, the patient's blood pressure was monitored at least every five minutes; pulse, and oxygen saturations were monitored continuously. All vital signs were documented in the nursing notes. The endoscope was inserted into the mouth and advanced under direct vision to second portion of the duodenum. A careful inspection was made as the gastroscope was withdrawn, including a retroflexed view of the proximal stomach; findings and interventions are described below. Findings:   Esophagus: normal  Stomach: normal s/p CLOtest biopsies  Duodenum: 5 mm sessile polyp in the bulb s/p biopsies, rest of the examined duodenum was normal    Interventions:    biopsy of stomach and duodenum    Specimens:   ID Type Source Tests Collected by Time Destination   1 : duodenal polyp biopsy Preservative Duodenum  Heather Donald MD 3/17/2022 1313 Pathology        EBL: None          Complications:     No immediate complications        Impression:  -See post-procedure diagnoses. Recommendations:  -Await pathology. Signed by:  Mary Gomez MD         3/17/2022 1:26 PM

## 2022-03-18 PROBLEM — I10 HYPERTENSION: Status: ACTIVE | Noted: 2021-08-17

## 2022-03-18 LAB
H PYLORI FROM TISSUE: NEGATIVE
KIT LOT NO., HCLOLOT: NORMAL
NEGATIVE CONTROL: NEGATIVE
POSITIVE CONTROL: POSITIVE

## 2022-03-19 PROBLEM — E66.01 OBESITY, MORBID (HCC): Status: ACTIVE | Noted: 2019-05-14

## 2022-03-19 PROBLEM — E55.9 VITAMIN D DEFICIENCY: Status: ACTIVE | Noted: 2018-04-16

## 2022-03-30 ENCOUNTER — CLINICAL SUPPORT (OUTPATIENT)
Dept: FAMILY MEDICINE CLINIC | Age: 37
End: 2022-03-30

## 2022-03-30 VITALS — DIASTOLIC BLOOD PRESSURE: 76 MMHG | SYSTOLIC BLOOD PRESSURE: 123 MMHG

## 2022-03-30 DIAGNOSIS — I10 ESSENTIAL HYPERTENSION: Primary | ICD-10-CM

## 2022-07-05 ENCOUNTER — PATIENT MESSAGE (OUTPATIENT)
Dept: FAMILY MEDICINE CLINIC | Age: 37
End: 2022-07-05

## 2022-07-05 DIAGNOSIS — I10 ESSENTIAL HYPERTENSION: Primary | ICD-10-CM

## 2022-07-05 DIAGNOSIS — E87.6 HYPOKALEMIA: ICD-10-CM

## 2022-07-06 DIAGNOSIS — E87.6 HYPOKALEMIA: ICD-10-CM

## 2022-07-06 DIAGNOSIS — I10 ESSENTIAL HYPERTENSION: ICD-10-CM

## 2022-07-06 RX ORDER — POTASSIUM CHLORIDE 20 MEQ/1
20 TABLET, EXTENDED RELEASE ORAL DAILY
Qty: 90 TABLET | Refills: 0 | Status: SHIPPED | OUTPATIENT
Start: 2022-07-06 | End: 2022-09-08

## 2022-07-06 RX ORDER — POTASSIUM CHLORIDE 20 MEQ/1
20 TABLET, EXTENDED RELEASE ORAL DAILY
Qty: 30 TABLET | Refills: 0 | Status: SHIPPED | OUTPATIENT
Start: 2022-07-06 | End: 2022-07-06

## 2022-07-06 NOTE — TELEPHONE ENCOUNTER
From: Emre Trejo  To: Meredith Madrid NP  Sent: 7/5/2022 4:23 PM EDT  Subject: Low potassium    Hey I did blood work for my upcoming surgery and they said my blood work is low and asked if you can put me on something or take me off whatever med is lowering my potassium so my surgery wont be cancelled.  Surgery date is July 25 and they sent over my lab results

## 2022-07-19 ENCOUNTER — OFFICE VISIT (OUTPATIENT)
Dept: FAMILY MEDICINE CLINIC | Age: 37
End: 2022-07-19

## 2022-07-19 DIAGNOSIS — Z01.89 ROUTINE LAB DRAW: Primary | ICD-10-CM

## 2022-07-19 DIAGNOSIS — E87.6 HYPOKALEMIA: ICD-10-CM

## 2022-07-19 LAB
ANION GAP SERPL CALC-SCNC: 5 MMOL/L (ref 5–15)
BUN SERPL-MCNC: 18 MG/DL (ref 6–20)
BUN/CREAT SERPL: 16 (ref 12–20)
CALCIUM SERPL-MCNC: 9.7 MG/DL (ref 8.5–10.1)
CHLORIDE SERPL-SCNC: 104 MMOL/L (ref 97–108)
CO2 SERPL-SCNC: 30 MMOL/L (ref 21–32)
CREAT SERPL-MCNC: 1.14 MG/DL (ref 0.55–1.02)
GLUCOSE SERPL-MCNC: 206 MG/DL (ref 65–100)
POTASSIUM SERPL-SCNC: 3.7 MMOL/L (ref 3.5–5.1)
SODIUM SERPL-SCNC: 139 MMOL/L (ref 136–145)

## 2022-07-19 NOTE — PROGRESS NOTES
Willow Maldonado (: 1985) is a 43 y.o. female, established patient, here for evaluation of the following chief complaint(s):  Labs Only       ASSESSMENT/PLAN:  Below is the assessment and plan developed based on review of pertinent history, physical exam, labs, studies, and medications. 1. Routine lab draw  2. Hypokalemia  -     METABOLIC PANEL, BASIC; Future    An electronic signature was used to authenticate this note.   -- Lauren Carpenter NP

## 2022-08-22 ENCOUNTER — PATIENT MESSAGE (OUTPATIENT)
Dept: FAMILY MEDICINE CLINIC | Age: 37
End: 2022-08-22

## 2022-08-30 ENCOUNTER — OFFICE VISIT (OUTPATIENT)
Dept: FAMILY MEDICINE CLINIC | Age: 37
End: 2022-08-30

## 2022-08-30 VITALS — SYSTOLIC BLOOD PRESSURE: 124 MMHG | OXYGEN SATURATION: 96 % | HEART RATE: 82 BPM | DIASTOLIC BLOOD PRESSURE: 83 MMHG

## 2022-08-30 DIAGNOSIS — I10 ESSENTIAL HYPERTENSION: ICD-10-CM

## 2022-08-30 DIAGNOSIS — Z01.30 BLOOD PRESSURE CHECK: Primary | ICD-10-CM

## 2022-08-30 NOTE — PROGRESS NOTES
Chief Complaint   Patient presents with    Blood Pressure Check       Visit Vitals  /83   Pulse 82   SpO2 96%

## 2022-08-30 NOTE — PROGRESS NOTES
Kerry Barrios (: 1985) is a 42 y.o. female, established patient, here for evaluation of the following chief complaint(s):  Blood Pressure Check   NURSE VISIT ONLY    ASSESSMENT/PLAN:  Below is the assessment and plan developed based on review of pertinent history, physical exam, labs, studies, and medications. 1. Blood pressure check  2. Essential hypertension  Vitals:    22 0838   BP: 124/83   Pulse: 82   SpO2: 96%       An electronic signature was used to authenticate this note.   -- Sunil Storey NP

## 2022-09-08 ENCOUNTER — OFFICE VISIT (OUTPATIENT)
Dept: FAMILY MEDICINE CLINIC | Age: 37
End: 2022-09-08
Payer: COMMERCIAL

## 2022-09-08 VITALS
RESPIRATION RATE: 18 BRPM | DIASTOLIC BLOOD PRESSURE: 84 MMHG | OXYGEN SATURATION: 93 % | WEIGHT: 293 LBS | HEIGHT: 62 IN | HEART RATE: 86 BPM | SYSTOLIC BLOOD PRESSURE: 139 MMHG | BODY MASS INDEX: 53.92 KG/M2 | TEMPERATURE: 98.1 F

## 2022-09-08 DIAGNOSIS — R73.03 PREDIABETES: ICD-10-CM

## 2022-09-08 DIAGNOSIS — E78.2 MIXED HYPERLIPIDEMIA: ICD-10-CM

## 2022-09-08 DIAGNOSIS — I10 ESSENTIAL HYPERTENSION: Primary | ICD-10-CM

## 2022-09-08 DIAGNOSIS — Z90.3 HISTORY OF SLEEVE GASTRECTOMY: ICD-10-CM

## 2022-09-08 DIAGNOSIS — E55.9 VITAMIN D DEFICIENCY: ICD-10-CM

## 2022-09-08 LAB
25(OH)D3 SERPL-MCNC: 70.8 NG/ML (ref 30–100)
ALBUMIN SERPL-MCNC: 3.2 G/DL (ref 3.5–5)
ALBUMIN/GLOB SERPL: 0.9 {RATIO} (ref 1.1–2.2)
ALP SERPL-CCNC: 62 U/L (ref 45–117)
ALT SERPL-CCNC: 24 U/L (ref 12–78)
ANION GAP SERPL CALC-SCNC: 6 MMOL/L (ref 5–15)
AST SERPL-CCNC: 12 U/L (ref 15–37)
BILIRUB SERPL-MCNC: 0.6 MG/DL (ref 0.2–1)
BUN SERPL-MCNC: 11 MG/DL (ref 6–20)
BUN/CREAT SERPL: 11 (ref 12–20)
CALCIUM SERPL-MCNC: 9 MG/DL (ref 8.5–10.1)
CHLORIDE SERPL-SCNC: 109 MMOL/L (ref 97–108)
CHOLEST SERPL-MCNC: 157 MG/DL
CO2 SERPL-SCNC: 25 MMOL/L (ref 21–32)
CREAT SERPL-MCNC: 1.01 MG/DL (ref 0.55–1.02)
EST. AVERAGE GLUCOSE BLD GHB EST-MCNC: 154 MG/DL
GLOBULIN SER CALC-MCNC: 3.4 G/DL (ref 2–4)
GLUCOSE SERPL-MCNC: 106 MG/DL (ref 65–100)
HBA1C MFR BLD: 7 % (ref 4–5.6)
HDLC SERPL-MCNC: 40 MG/DL
HDLC SERPL: 3.9 {RATIO} (ref 0–5)
LDLC SERPL CALC-MCNC: 97.8 MG/DL (ref 0–100)
POTASSIUM SERPL-SCNC: 3.9 MMOL/L (ref 3.5–5.1)
PROT SERPL-MCNC: 6.6 G/DL (ref 6.4–8.2)
SODIUM SERPL-SCNC: 140 MMOL/L (ref 136–145)
TRIGL SERPL-MCNC: 96 MG/DL (ref ?–150)
VLDLC SERPL CALC-MCNC: 19.2 MG/DL

## 2022-09-08 PROCEDURE — 99213 OFFICE O/P EST LOW 20 MIN: CPT | Performed by: NURSE PRACTITIONER

## 2022-09-08 RX ORDER — FAMOTIDINE 10 MG/1
10 TABLET ORAL 2 TIMES DAILY
COMMUNITY

## 2022-09-08 NOTE — PROGRESS NOTES
Walter Starkey (: 1985) is a 42 y.o. female, established patient, here for evaluation of the following chief complaint(s):  Hypertension (6m fu )       ASSESSMENT/PLAN:  Below is the assessment and plan developed based on review of pertinent history, physical exam, labs, studies, and medications. 1. Essential hypertension - high normal.  Has not taken losartan today. -     METABOLIC PANEL, COMPREHENSIVE; Future  2. Mixed hyperlipidemia - has been off atorvastatin. Had sleeve gastrectomy done in 2022. Will check FLP to see if need to restart statin  -     METABOLIC PANEL, COMPREHENSIVE; Future  -     LIPID PANEL; Future  3. Prediabetes - has been off metformin. Check W7A  -     METABOLIC PANEL, COMPREHENSIVE; Future  -     HEMOGLOBIN A1C WITH EAG; Future  4. Vitamin D deficiency - only taking MVI  -     VITAMIN D, 25 HYDROXY; Future  5. History sleeve gastrectomy    Return in about 6 months (around 3/8/2023). SUBJECTIVE/OBJECTIVE:  HPI  patient comes in today for follow up HTN  Patient was 15 min late for appt  Had surgery 22 - had sleeve gastrectomy. Has lost about 35 pounds!! Has follow up with bariatrics in Oct 2022  Had nutrition visit last week. Thinks she is getting enough nutrition. States she is trying to follow diet plan  Weight Metrics 2022 3/17/2022 3/8/2022 2021 2021 2019 2018   Weight 295 lb 314 lb 314 lb 12.8 oz 310 lb 6.4 oz 315 lb 3.2 oz 286 lb 9.6 oz 303 lb 12.8 oz   BMI 53.96 kg/m2 57.43 kg/m2 55.76 kg/m2 54.98 kg/m2 55.84 kg/m2 50.77 kg/m2 53.82 kg/m2   BP is lightly elevated this morning, but patient has not taken losartan yet today. She is off amlodipine. She is not taking atorvastatin because she thought it was a BP med, not chol med. She is not taking vit D supplement because she is taking bariatric MVI    She has been off metformin.   Has not check BG    No Known Allergies    Past Medical History:   Diagnosis Date    Anemia Breast lump     left breast lump, doctor felt lump 2018    Hypertension 2021    Mixed incontinence urge and stress     Vitamin D deficiency 2018       Past Surgical History:   Procedure Laterality Date    HX PARTIAL HYSTERECTOMY      left ovaries     HX TUBAL LIGATION             Social History     Socioeconomic History    Marital status: SINGLE     Spouse name: Not on file    Number of children: Not on file    Years of education: Not on file    Highest education level: Not on file   Occupational History    Not on file   Tobacco Use    Smoking status: Former     Packs/day: 1.00     Years: 11.00     Pack years: 11.00     Types: Cigarettes     Quit date: 2022     Years since quittin.5    Smokeless tobacco: Never   Vaping Use    Vaping Use: Never used   Substance and Sexual Activity    Alcohol use: Yes     Alcohol/week: 1.0 standard drink     Types: 1 Glasses of wine per week     Comment: socially    Drug use: Yes     Types: Marijuana     Comment: socially    Sexual activity: Yes     Partners: Male     Birth control/protection: Surgical   Other Topics Concern    Not on file   Social History Narrative    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 Social Determinants of Health     Financial Resource Strain: Not on file   Food Insecurity: Not on file   Transportation Needs: Not on file   Physical Activity: Not on file   Stress: Not on file   Social Connections: Not on file   Intimate Partner Violence: Not on file   Housing Stability: Not on file       Family History   Problem Relation Age of Onset    No Known Problems Mother     Hypertension Father     Diabetes Sister     Hypertension Sister     No Known Problems Brother     Diabetes Sister     Cancer Maternal Grandfather         thyroid    Stroke Maternal Grandmother        Current Outpatient Medications   Medication Sig    famotidine (PEPCID) 10 mg tablet Take 10 mg by mouth two (2) times a day.     multivitamin (ONE A DAY) tablet Take 1 Tablet by mouth daily. losartan (COZAAR) 50 mg tablet Take 1 Tablet by mouth daily. atorvastatin (LIPITOR) 20 mg tablet Take 1 Tablet by mouth daily. (Patient not taking: Reported on 9/8/2022)     No current facility-administered medications for this visit. Review of Systems   Constitutional:  Negative for chills, diaphoresis, fatigue, fever and unexpected weight change. Respiratory:  Negative for cough and shortness of breath. Cardiovascular:  Negative for chest pain, palpitations and leg swelling. Gastrointestinal:  Negative for abdominal pain, blood in stool, constipation, diarrhea, nausea and vomiting. Endocrine: Negative for polydipsia, polyphagia and polyuria. Genitourinary:  Negative for dysuria, flank pain, frequency, hematuria and urgency. Musculoskeletal:  Negative for back pain and myalgias. Skin: Negative. Neurological:  Negative for dizziness, speech difficulty, light-headedness, numbness and headaches. Psychiatric/Behavioral:  Negative for dysphoric mood and sleep disturbance. The patient is not nervous/anxious. Physical Exam  Vitals reviewed. Constitutional:       Appearance: Normal appearance. She is well-developed and well-groomed. HENT:      Right Ear: Hearing normal.      Left Ear: Hearing normal.   Cardiovascular:      Rate and Rhythm: Normal rate and regular rhythm. Pulses:           Dorsalis pedis pulses are 2+ on the right side and 2+ on the left side. Heart sounds: Normal heart sounds. Pulmonary:      Effort: Pulmonary effort is normal.      Breath sounds: Normal breath sounds. Musculoskeletal:      Right lower leg: No edema. Left lower leg: No edema. Skin:     General: Skin is warm and dry. Neurological:      Mental Status: She is alert and oriented to person, place, and time.    Psychiatric:         Attention and Perception: Attention normal.         Mood and Affect: Mood and affect normal.         Speech: Speech normal.         Behavior: Behavior normal. Behavior is cooperative. Thought Content: Thought content normal.         Cognition and Memory: Cognition and memory normal.         On this date 09/08/2022 I have spent 25 minutes reviewing previous notes, test results and face to face with the patient discussing the diagnosis and importance of compliance with the treatment plan as well as documenting on the day of the visit. An electronic signature was used to authenticate this note.   -- Surya Barron NP

## 2022-09-08 NOTE — PROGRESS NOTES
Chief Complaint   Patient presents with    Hypertension     6m fu        1. \"Have you been to the ER, urgent care clinic since your last visit? Hospitalized since your last visit? \" No    2. \"Have you seen or consulted any other health care providers outside of the [address] [place] [address] since your last visit? \" No     3. For patients aged 39-70: Has the patient had a colonoscopy / FIT/ Cologuard? NA - based on age      If the patient is female:    4. For patients aged 41-77: Has the patient had a mammogram within the past 2 years? NA - based on age or sex      11. For patients aged 21-65: Has the patient had a pap smear? Yes - Care Gap present.  Most recent result on file    Health Maintenance Due   Topic Date Due    Flu Vaccine (1) 09/01/2022

## 2022-09-09 NOTE — PROGRESS NOTES
A1c 7% - diabetes stable. Hopefully with some more weight loss, this number will go down more in the coming months. Liver and kidney function normal.  Cholesterol numbers look great!   Vitamin D normal.

## 2023-03-08 ENCOUNTER — OFFICE VISIT (OUTPATIENT)
Dept: FAMILY MEDICINE CLINIC | Age: 38
End: 2023-03-08
Payer: COMMERCIAL

## 2023-03-08 VITALS
OXYGEN SATURATION: 97 % | RESPIRATION RATE: 18 BRPM | HEIGHT: 62 IN | SYSTOLIC BLOOD PRESSURE: 140 MMHG | HEART RATE: 70 BPM | BODY MASS INDEX: 48.84 KG/M2 | TEMPERATURE: 98.1 F | WEIGHT: 265.4 LBS | DIASTOLIC BLOOD PRESSURE: 88 MMHG

## 2023-03-08 DIAGNOSIS — K64.9 HEMORRHOIDS, UNSPECIFIED HEMORRHOID TYPE: ICD-10-CM

## 2023-03-08 DIAGNOSIS — Z90.3 HISTORY OF SLEEVE GASTRECTOMY: ICD-10-CM

## 2023-03-08 DIAGNOSIS — R15.1 FECAL SMEARING: ICD-10-CM

## 2023-03-08 DIAGNOSIS — Z11.4 ENCOUNTER FOR SCREENING FOR HIV: ICD-10-CM

## 2023-03-08 DIAGNOSIS — E78.2 MIXED HYPERLIPIDEMIA: ICD-10-CM

## 2023-03-08 DIAGNOSIS — E66.01 MORBID OBESITY WITH BMI OF 45.0-49.9, ADULT (HCC): ICD-10-CM

## 2023-03-08 DIAGNOSIS — Z11.3 ROUTINE SCREENING FOR STI (SEXUALLY TRANSMITTED INFECTION): ICD-10-CM

## 2023-03-08 DIAGNOSIS — R73.03 PREDIABETES: ICD-10-CM

## 2023-03-08 DIAGNOSIS — E55.9 VITAMIN D DEFICIENCY: ICD-10-CM

## 2023-03-08 DIAGNOSIS — N76.0 ACUTE VAGINITIS: ICD-10-CM

## 2023-03-08 DIAGNOSIS — I10 ESSENTIAL HYPERTENSION: Primary | ICD-10-CM

## 2023-03-08 PROCEDURE — 99214 OFFICE O/P EST MOD 30 MIN: CPT | Performed by: NURSE PRACTITIONER

## 2023-03-08 PROCEDURE — 3077F SYST BP >= 140 MM HG: CPT | Performed by: NURSE PRACTITIONER

## 2023-03-08 PROCEDURE — 3079F DIAST BP 80-89 MM HG: CPT | Performed by: NURSE PRACTITIONER

## 2023-03-08 RX ORDER — LOSARTAN POTASSIUM 50 MG/1
50 TABLET ORAL DAILY
Qty: 90 TABLET | Refills: 3 | Status: SHIPPED | OUTPATIENT
Start: 2023-03-08

## 2023-03-08 NOTE — PROGRESS NOTES
Chief Complaint   Patient presents with    Hypertension     6m fu        1. \"Have you been to the ER, urgent care clinic since your last visit? Hospitalized since your last visit? \" No    2. \"Have you seen or consulted any other health care providers outside of the [address] [place] [address] since your last visit? \" No     3. For patients aged 39-70: Has the patient had a colonoscopy / FIT/ Cologuard? NA - based on age      If the patient is female:    4. For patients aged 41-77: Has the patient had a mammogram within the past 2 years? NA - based on age or sex      11. For patients aged 21-65: Has the patient had a pap smear? No - hysterectomy    Pt is here for 6 month follow up this morning. Concerned with recent vaginal itching, requesting that to be check along with lab work today.      Health Maintenance Due   Topic Date Due    COVID-19 Vaccine (2 - Booster for Ozzy series) 05/27/2021    Flu Vaccine (1) 08/01/2022

## 2023-03-08 NOTE — PROGRESS NOTES
Queen Marco Antonio (: 1985) is a 42 y.o. female, established patient, here for evaluation of the following chief complaint(s):  Hypertension (6m fu )       ASSESSMENT/PLAN:  Below is the assessment and plan developed based on review of pertinent history, physical exam, labs, studies, and medications. 1. Essential hypertension - not at goal, did not take BP med today  -     losartan (COZAAR) 50 mg tablet; Take 1 Tablet by mouth daily. , Normal, Disp-90 Tablet, R-3  -     METABOLIC PANEL, BASIC; Future  2. Prediabetes  -     METABOLIC PANEL, BASIC; Future  -     HEMOGLOBIN A1C WITH EAG; Future  3. Mixed hyperlipidemia  -     LIPID PANEL; Future  4. Acute vaginitis - likely related to change in soap. Will check NuSwab  -     NUSWAB VAGINITIS PLUS; Future  5. History of sleeve gastrectomy  -     VITAMIN D, 25 HYDROXY; Future  -     VITAMIN B12 & FOLATE; Future  -     FERRITIN; Future  -     IRON PROFILE; Future  6. Vitamin D deficiency  -     VITAMIN D, 25 HYDROXY; Future  7. Morbid obesity with BMI of 45.0-49.9, adult ([facility] [address] - she has lost 70 pounds since sleeve gastrectomy. Followed by bariatrics  8. Hemorrhoids, unspecified hemorrhoid type  -     REFERRAL TO COLON AND RECTAL SURGERY  9. Fecal smearing  -     REFERRAL TO COLON AND RECTAL SURGERY  10. Routine screening for STI (sexually transmitted infection)  -     NUSWAB VAGINITIS PLUS; Future  -     HEPATITIS C AB; Future  -     T PALLIDUM AB; Future  11. Encounter for screening for HIV  -     HIV 1/2 AG/AB, 4TH GENERATION,W RFLX CONFIRM; Future      Return in about 6 months (around 2023). SUBJECTIVE/OBJECTIVE:  HPI  patient comes in today for follow up HTN, prediabetes  Had surgery 20 - had sleeve gastrectomy with Dr. Cari Panda.   Has lost about 70 pounds!!   States she is trying to follow diet plan  Weight Metrics 3/8/2023 2022 3/17/2022 3/8/2022 2021 2021 2019   Weight 265 lb 6.4 oz 295 lb 314 lb 314 lb 12.8 oz 310 lb 6.4 oz 315 lb 3.2 oz 286 lb 9.6 oz   BMI 48.54 kg/m2 53.96 kg/m2 57.43 kg/m2 55.76 kg/m2 54.98 kg/m2 55.84 kg/m2 50.77 kg/m2     BP is slightly elevated this morning, but patient has not taken losartan yet today. She is off amlodipine. She is not taking atorvastatin, last LDL <100    Does not monitor BP at home  Does not monitor BG at home. She is not taking vit D supplement because she is taking bariatric MVI   She has been off metformin. Having some vaginal itching. Changed soaps, thinks it has caused bacterial infection. ,  Had hemorrhoids after having one of her kids. Couple years ago, did keto diet and had constipation. Takes benefiber. Using [employer] powder in drink. Has tried laxative. Does not feel she can empty her bowel. States she does tend to sit for prolonged periods on toilet. No Known Allergies    Past Medical History:   Diagnosis Date    Anemia     Breast lump     left breast lump, doctor felt lump 2018    History of sleeve gastrectomy 2022    Hypertension 2021    Mixed incontinence urge and stress     Vitamin D deficiency 2018       Past Surgical History:   Procedure Laterality Date    HX BARIATRIC SURGERY  2022    sleeve gastrectomy    HX PARTIAL HYSTERECTOMY      left ovaries     HX TUBAL LIGATION             Social History     Socioeconomic History    Marital status: SINGLE     Spouse name: Not on file    Number of children: Not on file    Years of education: Not on file    Highest education level: Not on file   Occupational History    Not on file   Tobacco Use    Smoking status: Former     Packs/day: 1.00     Years: 11.00     Pack years: 11.00     Types: Cigarettes     Quit date: 2022     Years since quittin.0    Smokeless tobacco: Never   Vaping Use    Vaping Use: Never used   Substance and Sexual Activity    Alcohol use:  Yes     Alcohol/week: 1.0 standard drink     Types: 1 Glasses of wine per week     Comment: socially    Drug use: Yes     Types: Marijuana     Comment: socially    Sexual activity: Yes     Partners: Male     Birth control/protection: Surgical   Other Topics Concern    Not on file   Social History Narrative    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 Social Determinants of Health     Financial Resource Strain: Low Risk     Difficulty of Paying Living Expenses: Not hard at all   Food Insecurity: No Food Insecurity    Worried About Running Out of Food in the Last Year: Never true    Ran Out of Food in the Last Year: Never true   Transportation Needs: Not on file   Physical Activity: Not on file   Stress: Not on file   Social Connections: Not on file   Intimate Partner Violence: Not on file   Housing Stability: Not on file       Family History   Problem Relation Age of Onset    No Known Problems Mother     Hypertension Father     Diabetes Sister     Hypertension Sister     No Known Problems Brother     Diabetes Sister     Cancer Maternal Grandfather         thyroid    Stroke Maternal Grandmother        Current Outpatient Medications   Medication Sig    losartan (COZAAR) 50 mg tablet Take 1 Tablet by mouth daily. No current facility-administered medications for this visit. Review of Systems   Constitutional:  Negative for chills, fatigue and fever. Respiratory:  Negative for cough and shortness of breath. Cardiovascular:  Negative for chest pain, palpitations and leg swelling. Gastrointestinal:  Positive for constipation. Negative for abdominal pain, nausea and vomiting. Hemorrhoids, fecal smearing/incontinence   Genitourinary:  Positive for vaginal discharge (itching). Negative for dysuria, flank pain, frequency, hematuria and urgency. Skin: Negative. Neurological:  Negative for dizziness, speech difficulty, light-headedness, numbness and headaches. Psychiatric/Behavioral:  Negative for dysphoric mood and sleep disturbance. The patient is not nervous/anxious. Physical Exam  Vitals reviewed. Constitutional:       Appearance: Normal appearance. She is well-developed and well-groomed. HENT:      Right Ear: Hearing normal.      Left Ear: Hearing normal.   Neck:      Thyroid: No thyromegaly. Cardiovascular:      Rate and Rhythm: Normal rate and regular rhythm. Pulses:           Dorsalis pedis pulses are 2+ on the right side and 2+ on the left side. Heart sounds: Normal heart sounds. Pulmonary:      Effort: Pulmonary effort is normal.      Breath sounds: Normal breath sounds. Abdominal:      General: Bowel sounds are normal.      Palpations: Abdomen is soft. Tenderness: There is no abdominal tenderness. Genitourinary:     Comments: Patient collected own NuSwab sample  Musculoskeletal:      Right lower leg: No edema. Left lower leg: No edema. Lymphadenopathy:      Cervical: No cervical adenopathy. Skin:     General: Skin is warm and dry. Neurological:      Mental Status: She is alert and oriented to person, place, and time. Psychiatric:         Attention and Perception: Attention normal.         Mood and Affect: Mood and affect normal.         Speech: Speech normal.         Behavior: Behavior normal. Behavior is cooperative. Thought Content: Thought content normal.         Cognition and Memory: Cognition and memory normal.     On this date 03/08/2023 I have spent 35 minutes reviewing previous notes, test results and face to face with the patient discussing the diagnosis and importance of compliance with the treatment plan as well as documenting on the day of the visit. An electronic signature was used to authenticate this note.   -- Debra Owen NP

## 2023-03-09 LAB
25(OH)D3 SERPL-MCNC: 30.7 NG/ML (ref 30–100)
ANION GAP SERPL CALC-SCNC: 1 MMOL/L (ref 5–15)
BUN SERPL-MCNC: 15 MG/DL (ref 6–20)
BUN/CREAT SERPL: 14 (ref 12–20)
CALCIUM SERPL-MCNC: 9.1 MG/DL (ref 8.5–10.1)
CHLORIDE SERPL-SCNC: 110 MMOL/L (ref 97–108)
CHOLEST SERPL-MCNC: 206 MG/DL
CO2 SERPL-SCNC: 27 MMOL/L (ref 21–32)
CREAT SERPL-MCNC: 1.04 MG/DL (ref 0.55–1.02)
EST. AVERAGE GLUCOSE BLD GHB EST-MCNC: 123 MG/DL
FERRITIN SERPL-MCNC: 44 NG/ML (ref 26–388)
FOLATE SERPL-MCNC: 35.1 NG/ML (ref 5–21)
GLUCOSE SERPL-MCNC: 108 MG/DL (ref 65–100)
HBA1C MFR BLD: 5.9 % (ref 4–5.6)
HCV AB SERPL QL IA: NONREACTIVE
HDLC SERPL-MCNC: 41 MG/DL
HDLC SERPL: 5 (ref 0–5)
HIV 1+2 AB+HIV1 P24 AG SERPL QL IA: NONREACTIVE
HIV12 RESULT COMMENT, HHIVC: NORMAL
IRON SATN MFR SERPL: 21 % (ref 20–50)
IRON SERPL-MCNC: 65 UG/DL (ref 35–150)
LDLC SERPL CALC-MCNC: 145.4 MG/DL (ref 0–100)
POTASSIUM SERPL-SCNC: 3.9 MMOL/L (ref 3.5–5.1)
SODIUM SERPL-SCNC: 138 MMOL/L (ref 136–145)
TIBC SERPL-MCNC: 306 UG/DL (ref 250–450)
TRIGL SERPL-MCNC: 98 MG/DL (ref ?–150)
VIT B12 SERPL-MCNC: 804 PG/ML (ref 193–986)
VLDLC SERPL CALC-MCNC: 19.6 MG/DL

## 2023-03-10 LAB — T PALLIDUM AB SER QL IA: NON REACTIVE

## 2023-03-12 LAB
A VAGINAE DNA VAG QL NAA+PROBE: ABNORMAL SCORE
BVAB2 DNA VAG QL NAA+PROBE: ABNORMAL SCORE
C ALBICANS DNA VAG QL NAA+PROBE: POSITIVE
C GLABRATA DNA VAG QL NAA+PROBE: NEGATIVE
C TRACH RRNA SPEC QL NAA+PROBE: NEGATIVE
MEGA1 DNA VAG QL NAA+PROBE: ABNORMAL SCORE
N GONORRHOEA RRNA SPEC QL NAA+PROBE: NEGATIVE
SPECIMEN SOURCE: ABNORMAL
T VAGINALIS RRNA SPEC QL NAA+PROBE: NEGATIVE

## 2023-03-14 ENCOUNTER — PATIENT MESSAGE (OUTPATIENT)
Dept: FAMILY MEDICINE CLINIC | Age: 38
End: 2023-03-14

## 2023-03-14 DIAGNOSIS — B37.31 VAGINAL YEAST INFECTION: ICD-10-CM

## 2023-03-14 DIAGNOSIS — B96.89 BV (BACTERIAL VAGINOSIS): Primary | ICD-10-CM

## 2023-03-14 DIAGNOSIS — N76.0 BV (BACTERIAL VAGINOSIS): Primary | ICD-10-CM

## 2023-03-14 RX ORDER — FLUCONAZOLE 150 MG/1
150 TABLET ORAL DAILY
Qty: 2 TABLET | Refills: 0 | Status: SHIPPED | OUTPATIENT
Start: 2023-03-14 | End: 2023-03-15

## 2023-03-14 RX ORDER — METRONIDAZOLE 500 MG/1
500 TABLET ORAL 2 TIMES DAILY
Qty: 14 TABLET | Refills: 0 | Status: SHIPPED | OUTPATIENT
Start: 2023-03-14 | End: 2023-03-21

## 2023-03-14 NOTE — TELEPHONE ENCOUNTER
Loco Wellington 3/14/2023 9:14 AM EDT      ----- Message -----  From: Contreras Chatman  Sent: 3/14/2023 9:01 AM EDT  To: [facility] Nurse Pool  Subject: Sha Gould. I looked over my blood work results Sunday. Are you going to send me a prescription for the bv and candida?

## 2023-03-15 RX ORDER — ATORVASTATIN CALCIUM 20 MG/1
20 TABLET, FILM COATED ORAL DAILY
Qty: 90 TABLET | Refills: 3 | Status: SHIPPED | OUTPATIENT
Start: 2023-03-15

## 2023-06-29 ENCOUNTER — PATIENT MESSAGE (OUTPATIENT)
Age: 38
End: 2023-06-29

## 2023-06-29 RX ORDER — LOSARTAN POTASSIUM 50 MG/1
50 TABLET ORAL DAILY
Qty: 90 TABLET | Refills: 3 | Status: SHIPPED | OUTPATIENT
Start: 2023-06-29

## 2023-09-11 RX ORDER — AMLODIPINE BESYLATE 10 MG/1
10 TABLET ORAL DAILY
Qty: 90 TABLET | Refills: 3 | OUTPATIENT
Start: 2023-09-11

## 2023-09-11 NOTE — TELEPHONE ENCOUNTER
I show this was d/c on 9/8/22. Please sign if appropriate. Last appointment: 3/8/23  Next appointment: Clinton Ojeda to follow-up 9/8/23  Previous refill encounter(s): 2/21/22 #90 with 3 refills    Requested Prescriptions     Pending Prescriptions Disp Refills    amLODIPine (NORVASC) 10 MG tablet [Pharmacy Med Name: AMLODIPINE BESYLATE 10 MG TAB] 90 tablet 3     Sig: TAKE 1 TABLET BY MOUTH EVERY DAY         For Pharmacy Admin Tracking Only    Program: Medication Refill  CPA in place:    Recommendation Provided To:    Intervention Detail: New Rx: 1, reason: Patient Preference  Intervention Accepted By:   Mali Krishna Closed?:    Time Spent (min): 5

## 2023-09-12 ENCOUNTER — PATIENT MESSAGE (OUTPATIENT)
Age: 38
End: 2023-09-12

## 2023-09-12 DIAGNOSIS — I10 ESSENTIAL (PRIMARY) HYPERTENSION: Primary | ICD-10-CM

## 2023-09-12 RX ORDER — AMLODIPINE BESYLATE 10 MG/1
10 TABLET ORAL DAILY
Qty: 90 TABLET | Refills: 1 | Status: SHIPPED | OUTPATIENT
Start: 2023-09-12

## 2023-09-12 NOTE — TELEPHONE ENCOUNTER
From: Adam Macias  To: Margarette Max  Sent: 9/12/2023 8:20 AM EDT  Subject: Arlen Aceves I need the amlododine refilled

## 2023-09-14 RX ORDER — LOSARTAN POTASSIUM 50 MG/1
50 TABLET ORAL DAILY
Qty: 90 TABLET | Refills: 1 | Status: SHIPPED | OUTPATIENT
Start: 2023-09-14

## 2024-03-04 DIAGNOSIS — I10 ESSENTIAL (PRIMARY) HYPERTENSION: ICD-10-CM

## 2024-03-07 RX ORDER — AMLODIPINE BESYLATE 10 MG/1
10 TABLET ORAL DAILY
Qty: 90 TABLET | Refills: 0 | Status: SHIPPED | OUTPATIENT
Start: 2024-03-07

## 2024-03-25 ENCOUNTER — HOSPITAL ENCOUNTER (EMERGENCY)
Facility: HOSPITAL | Age: 39
Discharge: HOME | End: 2024-03-25
Payer: COMMERCIAL

## 2024-03-25 VITALS
HEART RATE: 85 BPM | HEIGHT: 64 IN | WEIGHT: 241.4 LBS | OXYGEN SATURATION: 96 % | DIASTOLIC BLOOD PRESSURE: 95 MMHG | SYSTOLIC BLOOD PRESSURE: 138 MMHG | RESPIRATION RATE: 16 BRPM | BODY MASS INDEX: 41.21 KG/M2 | TEMPERATURE: 98 F

## 2024-03-25 DIAGNOSIS — G43.909 MIGRAINE WITHOUT STATUS MIGRAINOSUS, NOT INTRACTABLE, UNSPECIFIED MIGRAINE TYPE: Primary | ICD-10-CM

## 2024-03-25 LAB
APPEARANCE UR: CLEAR
BACTERIA URNS QL MICRO: NEGATIVE /HPF
BILIRUB UR QL: NEGATIVE
COLOR UR: ABNORMAL
EPITH CASTS URNS QL MICRO: ABNORMAL /LPF
GLUCOSE UR STRIP.AUTO-MCNC: NEGATIVE MG/DL
HCG UR QL: NEGATIVE
HGB UR QL STRIP: ABNORMAL
HYALINE CASTS URNS QL MICRO: ABNORMAL /LPF (ref 0–2)
KETONES UR QL STRIP.AUTO: ABNORMAL MG/DL
LEUKOCYTE ESTERASE UR QL STRIP.AUTO: NEGATIVE
NITRITE UR QL STRIP.AUTO: NEGATIVE
PH UR STRIP: 6 (ref 5–8)
PROT UR STRIP-MCNC: ABNORMAL MG/DL
RBC #/AREA URNS HPF: ABNORMAL /HPF (ref 0–5)
SP GR UR REFRACTOMETRY: 1.03
URINE CULTURE IF INDICATED: ABNORMAL
UROBILINOGEN UR QL STRIP.AUTO: 1 EU/DL (ref 0.2–1)
WBC URNS QL MICRO: ABNORMAL /HPF (ref 0–4)

## 2024-03-25 PROCEDURE — 96372 THER/PROPH/DIAG INJ SC/IM: CPT

## 2024-03-25 PROCEDURE — 6360000002 HC RX W HCPCS

## 2024-03-25 PROCEDURE — 6370000000 HC RX 637 (ALT 250 FOR IP)

## 2024-03-25 PROCEDURE — 81025 URINE PREGNANCY TEST: CPT

## 2024-03-25 PROCEDURE — 99284 EMERGENCY DEPT VISIT MOD MDM: CPT

## 2024-03-25 PROCEDURE — 81001 URINALYSIS AUTO W/SCOPE: CPT

## 2024-03-25 RX ORDER — KETOROLAC TROMETHAMINE 30 MG/ML
15 INJECTION, SOLUTION INTRAMUSCULAR; INTRAVENOUS ONCE
Status: COMPLETED | OUTPATIENT
Start: 2024-03-25 | End: 2024-03-25

## 2024-03-25 RX ORDER — LORATADINE 10 MG/1
10 TABLET ORAL DAILY
Qty: 14 TABLET | Refills: 0 | Status: SHIPPED | OUTPATIENT
Start: 2024-03-25 | End: 2024-04-08

## 2024-03-25 RX ORDER — ACETAMINOPHEN 325 MG/1
650 TABLET ORAL
Status: COMPLETED | OUTPATIENT
Start: 2024-03-25 | End: 2024-03-25

## 2024-03-25 RX ORDER — FLUTICASONE PROPIONATE 50 MCG
2 SPRAY, SUSPENSION (ML) NASAL DAILY
Qty: 16 G | Refills: 0 | Status: SHIPPED | OUTPATIENT
Start: 2024-03-25

## 2024-03-25 RX ORDER — BUTALBITAL, ACETAMINOPHEN AND CAFFEINE 50; 325; 40 MG/1; MG/1; MG/1
1 TABLET ORAL
Status: COMPLETED | OUTPATIENT
Start: 2024-03-25 | End: 2024-03-25

## 2024-03-25 RX ORDER — DEXAMETHASONE 4 MG/1
8 TABLET ORAL ONCE
Status: COMPLETED | OUTPATIENT
Start: 2024-03-25 | End: 2024-03-25

## 2024-03-25 RX ORDER — BUTALBITAL, ACETAMINOPHEN AND CAFFEINE 50; 325; 40 MG/1; MG/1; MG/1
1 TABLET ORAL EVERY 4 HOURS PRN
Qty: 15 TABLET | Refills: 0 | Status: SHIPPED | OUTPATIENT
Start: 2024-03-25

## 2024-03-25 RX ORDER — DIPHENHYDRAMINE HCL 25 MG
50 CAPSULE ORAL
Status: COMPLETED | OUTPATIENT
Start: 2024-03-25 | End: 2024-03-25

## 2024-03-25 RX ADMIN — DIPHENHYDRAMINE HYDROCHLORIDE 50 MG: 25 CAPSULE ORAL at 18:43

## 2024-03-25 RX ADMIN — DEXAMETHASONE 8 MG: 4 TABLET ORAL at 18:43

## 2024-03-25 RX ADMIN — ACETAMINOPHEN 650 MG: 325 TABLET ORAL at 18:43

## 2024-03-25 RX ADMIN — BUTALBITAL, ACETAMINOPHEN, AND CAFFEINE 1 TABLET: 50; 325; 40 TABLET ORAL at 18:43

## 2024-03-25 RX ADMIN — KETOROLAC TROMETHAMINE 15 MG: 30 INJECTION, SOLUTION INTRAMUSCULAR; INTRAVENOUS at 18:47

## 2024-03-25 ASSESSMENT — PAIN DESCRIPTION - LOCATION: LOCATION: HEAD

## 2024-03-25 ASSESSMENT — PAIN SCALES - GENERAL: PAINLEVEL_OUTOF10: 10

## 2024-03-25 NOTE — ED PROVIDER NOTES
Return precautions have been discussed with the patient and she has been encouraged to follow-up with her primary care physician.  She verbalizes understanding is agreement this plan.  She is stable for discharge home. [LK]      ED Course User Index  [LK] Za Celestin PA-C       FINAL IMPRESSION     1. Migraine without status migrainosus, not intractable, unspecified migraine type          DISPOSITION/PLAN   DISPOSITION Decision To Discharge 03/25/2024 08:05:26 PM    Discharge Note: The patient is stable for discharge home. The signs, symptoms, diagnosis, and discharge instructions have been discussed, understanding conveyed, and agreed upon. The patient is to follow up as recommended or return to ER should their symptoms worsen.      PATIENT REFERRED TO:  Niesha Hartman APRN - NP  [address] [id number] [phone]    Schedule an appointment as soon as possible for a visit       [facility] EMERG[facility]  [address] [address]  [phone]    If symptoms worsen, As needed       DISCHARGE MEDICATIONS:     Medication List        START taking these medications      butalbital-acetaminophen-caffeine -40 MG per tablet  Commonly known as: FIORICET, ESGIC  Take 1 tablet by mouth every 4 hours as needed for Headaches or Migraine     fluticasone 50 MCG/ACT nasal spray  Commonly known as: FLONASE  2 sprays by Each Nostril route daily     loratadine 10 MG tablet  Commonly known as: Claritin  Take 1 tablet by mouth daily for 14 days            ASK your doctor about these medications      amLODIPine 10 MG tablet  Commonly known as: NORVASC  Take 1 tablet by mouth daily Patient needs an appointment for further refills     atorvastatin 20 MG tablet  Commonly known as: LIPITOR     losartan 50 MG tablet  Commonly known as: COZAAR  TAKE 1 TABLET BY MOUTH EVERY DAY               Where to Get Your Medications        These medications were sent to [facility]/pharmacy #[id number] - [place], [place] -

## 2024-03-26 NOTE — ED NOTES
Patient discharged from the ED by MIKE Owens. Diagnosis, medications, precautions and follow-ups were reviewed with the patient/family. Questions were asked and answered prior to departure. Patient departed the ED via walk-out and was accompanied by mother.

## 2024-06-23 DIAGNOSIS — I10 ESSENTIAL (PRIMARY) HYPERTENSION: ICD-10-CM

## 2024-06-24 NOTE — TELEPHONE ENCOUNTER
Last appointment: 3/8/23  Next appointment: Advised to follow-up 9/8/23  Previous refill encounter(s): 3/7/24 #90    Requested Prescriptions     Pending Prescriptions Disp Refills    amLODIPine (NORVASC) 10 MG tablet [Pharmacy Med Name: AMLODIPINE BESYLATE 10 MG TAB] 30 tablet 0     Sig: Take 1 tablet by mouth daily Patient needs an appointment for further refills         For Pharmacy Admin Tracking Only    Program: Medication Refill  CPA in place:    Recommendation Provided To:   Intervention Detail: New Rx: 1, reason: Patient Preference and Scheduled Appointment  Intervention Accepted By:   Gap Closed?:    Time Spent (min): 5

## 2024-06-25 RX ORDER — AMLODIPINE BESYLATE 10 MG/1
10 TABLET ORAL DAILY
Qty: 30 TABLET | Refills: 0 | Status: SHIPPED | OUTPATIENT
Start: 2024-06-25

## 2024-06-27 RX ORDER — LOSARTAN POTASSIUM 50 MG/1
50 TABLET ORAL DAILY
Qty: 30 TABLET | Refills: 0 | Status: SHIPPED | OUTPATIENT
Start: 2024-06-27

## 2024-06-27 NOTE — TELEPHONE ENCOUNTER
Last appointment: 3/8/23  Next appointment: none  Previous refill encounter(s): 9/14/23 #90 with 1 refill    Requested Prescriptions     Pending Prescriptions Disp Refills    losartan (COZAAR) 50 MG tablet 30 tablet 0     Sig: Take 1 tablet by mouth daily Patient needs an appointment for further refills         For Pharmacy Admin Tracking Only    Program: Medication Refill  CPA in place:    Recommendation Provided To:   Intervention Detail: New Rx: 1, reason: Patient Preference and Scheduled Appointment  Intervention Accepted By:   Gap Closed?:    Time Spent (min): 5

## 2024-09-10 SDOH — ECONOMIC STABILITY: INCOME INSECURITY: HOW HARD IS IT FOR YOU TO PAY FOR THE VERY BASICS LIKE FOOD, HOUSING, MEDICAL CARE, AND HEATING?: SOMEWHAT HARD

## 2024-09-10 SDOH — ECONOMIC STABILITY: FOOD INSECURITY: WITHIN THE PAST 12 MONTHS, YOU WORRIED THAT YOUR FOOD WOULD RUN OUT BEFORE YOU GOT MONEY TO BUY MORE.: NEVER TRUE

## 2024-09-10 SDOH — ECONOMIC STABILITY: TRANSPORTATION INSECURITY
IN THE PAST 12 MONTHS, HAS LACK OF TRANSPORTATION KEPT YOU FROM MEETINGS, WORK, OR FROM GETTING THINGS NEEDED FOR DAILY LIVING?: NO

## 2024-09-10 SDOH — ECONOMIC STABILITY: FOOD INSECURITY: WITHIN THE PAST 12 MONTHS, THE FOOD YOU BOUGHT JUST DIDN'T LAST AND YOU DIDN'T HAVE MONEY TO GET MORE.: SOMETIMES TRUE

## 2024-09-13 ENCOUNTER — OFFICE VISIT (OUTPATIENT)
Age: 39
End: 2024-09-13
Payer: COMMERCIAL

## 2024-09-13 VITALS
WEIGHT: 245.8 LBS | SYSTOLIC BLOOD PRESSURE: 134 MMHG | HEART RATE: 72 BPM | BODY MASS INDEX: 42.19 KG/M2 | TEMPERATURE: 98.3 F | DIASTOLIC BLOOD PRESSURE: 86 MMHG | OXYGEN SATURATION: 98 % | RESPIRATION RATE: 16 BRPM

## 2024-09-13 DIAGNOSIS — Z11.4 ENCOUNTER FOR SCREENING FOR HIV: ICD-10-CM

## 2024-09-13 DIAGNOSIS — E66.01 OBESITY, CLASS III, BMI 40-49.9 (MORBID OBESITY) (HCC): ICD-10-CM

## 2024-09-13 DIAGNOSIS — I10 ESSENTIAL (PRIMARY) HYPERTENSION: Primary | ICD-10-CM

## 2024-09-13 DIAGNOSIS — Z11.3 ROUTINE SCREENING FOR STI (SEXUALLY TRANSMITTED INFECTION): ICD-10-CM

## 2024-09-13 DIAGNOSIS — Z11.59 NEED FOR HEPATITIS C SCREENING TEST: ICD-10-CM

## 2024-09-13 PROCEDURE — 3075F SYST BP GE 130 - 139MM HG: CPT | Performed by: NURSE PRACTITIONER

## 2024-09-13 PROCEDURE — 3079F DIAST BP 80-89 MM HG: CPT | Performed by: NURSE PRACTITIONER

## 2024-09-13 PROCEDURE — 99213 OFFICE O/P EST LOW 20 MIN: CPT | Performed by: NURSE PRACTITIONER

## 2024-09-13 RX ORDER — LOSARTAN POTASSIUM 100 MG/1
100 TABLET ORAL DAILY
COMMUNITY
Start: 2024-07-26 | End: 2024-10-24

## 2024-09-13 ASSESSMENT — PATIENT HEALTH QUESTIONNAIRE - PHQ9
SUM OF ALL RESPONSES TO PHQ9 QUESTIONS 1 & 2: 0
2. FEELING DOWN, DEPRESSED OR HOPELESS: NOT AT ALL
SUM OF ALL RESPONSES TO PHQ QUESTIONS 1-9: 0
1. LITTLE INTEREST OR PLEASURE IN DOING THINGS: NOT AT ALL

## 2024-09-13 ASSESSMENT — ENCOUNTER SYMPTOMS
RESPIRATORY NEGATIVE: 1
GASTROINTESTINAL NEGATIVE: 1

## 2024-09-14 LAB
ALBUMIN SERPL-MCNC: 3.5 G/DL (ref 3.5–5)
ALBUMIN/GLOB SERPL: 1.1 (ref 1.1–2.2)
ALP SERPL-CCNC: 64 U/L (ref 45–117)
ALT SERPL-CCNC: 21 U/L (ref 12–78)
ANION GAP SERPL CALC-SCNC: 1 MMOL/L (ref 2–12)
AST SERPL-CCNC: 10 U/L (ref 15–37)
BILIRUB SERPL-MCNC: 0.3 MG/DL (ref 0.2–1)
BUN SERPL-MCNC: 14 MG/DL (ref 6–20)
BUN/CREAT SERPL: 13 (ref 12–20)
CALCIUM SERPL-MCNC: 9.2 MG/DL (ref 8.5–10.1)
CHLORIDE SERPL-SCNC: 108 MMOL/L (ref 97–108)
CO2 SERPL-SCNC: 29 MMOL/L (ref 21–32)
CREAT SERPL-MCNC: 1.04 MG/DL (ref 0.55–1.02)
ERYTHROCYTE [DISTWIDTH] IN BLOOD BY AUTOMATED COUNT: 13.6 % (ref 11.5–14.5)
GLOBULIN SER CALC-MCNC: 3.3 G/DL (ref 2–4)
GLUCOSE SERPL-MCNC: 86 MG/DL (ref 65–100)
HCT VFR BLD AUTO: 39.2 % (ref 35–47)
HGB BLD-MCNC: 13 G/DL (ref 11.5–16)
HIV 1+2 AB+HIV1 P24 AG SERPL QL IA: NONREACTIVE
HIV 1/2 RESULT COMMENT: NORMAL
MCH RBC QN AUTO: 31.6 PG (ref 26–34)
MCHC RBC AUTO-ENTMCNC: 33.2 G/DL (ref 30–36.5)
MCV RBC AUTO: 95.4 FL (ref 80–99)
NRBC # BLD: 0 K/UL (ref 0–0.01)
NRBC BLD-RTO: 0 PER 100 WBC
PLATELET # BLD AUTO: 236 K/UL (ref 150–400)
PMV BLD AUTO: 10.3 FL (ref 8.9–12.9)
POTASSIUM SERPL-SCNC: 3.7 MMOL/L (ref 3.5–5.1)
PROT SERPL-MCNC: 6.8 G/DL (ref 6.4–8.2)
RBC # BLD AUTO: 4.11 M/UL (ref 3.8–5.2)
SODIUM SERPL-SCNC: 138 MMOL/L (ref 136–145)
WBC # BLD AUTO: 6.8 K/UL (ref 3.6–11)

## 2024-09-16 LAB
HSV1 IGG SER IA-ACNC: 24.8 INDEX (ref 0–0.9)
HSV2 IGG SER IA-ACNC: 8.04 INDEX (ref 0–0.9)
T PALLIDUM AB SER QL IA: NON REACTIVE

## 2024-09-17 LAB
A VAGINAE DNA VAG QL NAA+PROBE: ABNORMAL SCORE
BVAB2 DNA VAG QL NAA+PROBE: ABNORMAL SCORE
C ALBICANS DNA VAG QL NAA+PROBE: NEGATIVE
C GLABRATA DNA VAG QL NAA+PROBE: NEGATIVE
C TRACH RRNA SPEC QL NAA+PROBE: NEGATIVE
HCV AB SERPL QL IA: NORMAL
HCV IGG SERPL QL IA: NON REACTIVE S/CO RATIO
MEGA1 DNA VAG QL NAA+PROBE: ABNORMAL SCORE
N GONORRHOEA RRNA SPEC QL NAA+PROBE: NEGATIVE
SPECIMEN SOURCE: ABNORMAL
T VAGINALIS RRNA SPEC QL NAA+PROBE: NEGATIVE

## 2024-10-22 RX ORDER — LOSARTAN POTASSIUM 100 MG/1
100 TABLET ORAL DAILY
Qty: 90 TABLET | Refills: 1 | Status: SHIPPED | OUTPATIENT
Start: 2024-10-22

## 2024-10-22 NOTE — TELEPHONE ENCOUNTER
Rx pended with updated dose of 100mg/day as per 7/26/24    Last appointment: 9/13/24  Next appointment: Advised to follow-up in 3-4 months    Requested Prescriptions     Pending Prescriptions Disp Refills    losartan (COZAAR) 100 MG tablet [Pharmacy Med Name: LOSARTAN POTASSIUM 50 MG TAB] 90 tablet 1     Sig: Take 1 tablet by mouth daily         For Pharmacy Admin Tracking Only    Program: Medication Refill  CPA in place:    Recommendation Provided To:   Intervention Detail: New Rx: 1, reason: Patient Preference  Intervention Accepted By:   Gap Closed?:    Time Spent (min): 5

## 2024-12-26 ENCOUNTER — HOSPITAL ENCOUNTER (EMERGENCY)
Facility: HOSPITAL | Age: 39
Discharge: HOME | End: 2024-12-26
Payer: COMMERCIAL

## 2024-12-26 ENCOUNTER — APPOINTMENT (OUTPATIENT)
Facility: HOSPITAL | Age: 39
End: 2024-12-26
Payer: COMMERCIAL

## 2024-12-26 VITALS
HEART RATE: 85 BPM | DIASTOLIC BLOOD PRESSURE: 98 MMHG | OXYGEN SATURATION: 99 % | WEIGHT: 245.59 LBS | TEMPERATURE: 98.2 F | BODY MASS INDEX: 42.16 KG/M2 | RESPIRATION RATE: 22 BRPM | SYSTOLIC BLOOD PRESSURE: 151 MMHG
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
## 2024-12-26 DIAGNOSIS — S02.2XXA CLOSED FRACTURE OF NASAL BONE, INITIAL ENCOUNTER: ICD-10-CM

## 2024-12-26 PROCEDURE — 99284 EMERGENCY DEPT VISIT MOD MDM: CPT

## 2024-12-26 PROCEDURE — 70486 CT MAXILLOFACIAL W/O DYE: CPT

## 2024-12-26 ASSESSMENT — PAIN SCALES - GENERAL: PAINLEVEL_OUTOF10: 7
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
## (undated) DEVICE — TUBING HYDR IRR --

## (undated) DEVICE — FORCEPS BX L240CM JAW DIA2.8MM L CAP W/ NDL MIC MESH TOOTH